# Patient Record
Sex: FEMALE | Race: ASIAN | NOT HISPANIC OR LATINO | Employment: UNEMPLOYED | URBAN - METROPOLITAN AREA
[De-identification: names, ages, dates, MRNs, and addresses within clinical notes are randomized per-mention and may not be internally consistent; named-entity substitution may affect disease eponyms.]

---

## 2017-01-25 ENCOUNTER — OFFICE VISIT - HEALTHEAST (OUTPATIENT)
Dept: FAMILY MEDICINE | Facility: CLINIC | Age: 22
End: 2017-01-25

## 2017-01-25 DIAGNOSIS — N61.0 MASTITIS: ICD-10-CM

## 2017-04-06 ENCOUNTER — AMBULATORY - HEALTHEAST (OUTPATIENT)
Dept: FAMILY MEDICINE | Facility: CLINIC | Age: 22
End: 2017-04-06

## 2017-04-06 DIAGNOSIS — Z11.1 TUBERCULOSIS SCREENING: ICD-10-CM

## 2017-04-10 ENCOUNTER — AMBULATORY - HEALTHEAST (OUTPATIENT)
Dept: LAB | Facility: CLINIC | Age: 22
End: 2017-04-10

## 2017-04-10 DIAGNOSIS — Z11.1 TUBERCULOSIS SCREENING: ICD-10-CM

## 2017-04-12 ENCOUNTER — COMMUNICATION - HEALTHEAST (OUTPATIENT)
Dept: FAMILY MEDICINE | Facility: CLINIC | Age: 22
End: 2017-04-12

## 2017-11-19 ENCOUNTER — HEALTH MAINTENANCE LETTER (OUTPATIENT)
Age: 22
End: 2017-11-19

## 2018-05-15 ENCOUNTER — OFFICE VISIT - HEALTHEAST (OUTPATIENT)
Dept: FAMILY MEDICINE | Facility: CLINIC | Age: 23
End: 2018-05-15

## 2018-05-15 DIAGNOSIS — R22.9 SKIN MASS: ICD-10-CM

## 2018-05-15 DIAGNOSIS — M54.50 LOW BACK PAIN: ICD-10-CM

## 2018-05-15 RX ORDER — ACETAMINOPHEN 325 MG/1
TABLET ORAL
Qty: 120 TABLET | Refills: 0 | Status: SHIPPED | OUTPATIENT
Start: 2018-05-15 | End: 2021-10-20

## 2020-10-22 ENCOUNTER — OFFICE VISIT - HEALTHEAST (OUTPATIENT)
Dept: FAMILY MEDICINE | Facility: CLINIC | Age: 25
End: 2020-10-22

## 2020-10-22 DIAGNOSIS — N89.8 VAGINAL DISCHARGE: ICD-10-CM

## 2020-10-22 DIAGNOSIS — Z31.69 INFERTILITY COUNSELING: ICD-10-CM

## 2020-10-22 DIAGNOSIS — Z00.00 ANNUAL PHYSICAL EXAM: ICD-10-CM

## 2020-10-22 LAB
CLUE CELLS: NORMAL
FSH SERPL-ACNC: <3 MIU/ML
PROLACTIN SERPL-MCNC: 13.2 NG/ML (ref 0–20)
TRICHOMONAS, WET PREP: NORMAL
TSH SERPL DL<=0.005 MIU/L-ACNC: 0.95 UIU/ML (ref 0.3–5)
YEAST, WET PREP: NORMAL

## 2020-10-22 ASSESSMENT — MIFFLIN-ST. JEOR: SCORE: 1232.67

## 2020-10-23 LAB
BKR LAB AP ABNORMAL BLEEDING: NO
BKR LAB AP BIRTH CONTROL/HORMONES: NORMAL
BKR LAB AP CERVICAL APPEARANCE: NORMAL
BKR LAB AP GYN ADEQUACY: NORMAL
BKR LAB AP GYN INTERPRETATION: NORMAL
BKR LAB AP GYN OTHER FINDINGS: NORMAL
BKR LAB AP HPV REFLEX: NORMAL
BKR LAB AP LMP: NORMAL
BKR LAB AP PATIENT STATUS: NORMAL
BKR LAB AP PREVIOUS ABNORMAL: NORMAL
BKR LAB AP PREVIOUS NORMAL: 2015
C TRACH DNA SPEC QL PROBE+SIG AMP: NEGATIVE
HIGH RISK?: NO
N GONORRHOEA DNA SPEC QL NAA+PROBE: NEGATIVE
PATH REPORT.COMMENTS IMP SPEC: NORMAL
RESULT FLAG (HE HISTORICAL CONVERSION): NORMAL

## 2020-10-26 ENCOUNTER — COMMUNICATION - HEALTHEAST (OUTPATIENT)
Dept: FAMILY MEDICINE | Facility: CLINIC | Age: 25
End: 2020-10-26

## 2020-10-26 DIAGNOSIS — N89.8 VAGINAL DISCHARGE: ICD-10-CM

## 2020-10-27 ENCOUNTER — COMMUNICATION - HEALTHEAST (OUTPATIENT)
Dept: FAMILY MEDICINE | Facility: CLINIC | Age: 25
End: 2020-10-27

## 2021-05-17 ENCOUNTER — COMMUNICATION - HEALTHEAST (OUTPATIENT)
Dept: FAMILY MEDICINE | Facility: CLINIC | Age: 26
End: 2021-05-17

## 2021-05-18 ENCOUNTER — OFFICE VISIT - HEALTHEAST (OUTPATIENT)
Dept: FAMILY MEDICINE | Facility: CLINIC | Age: 26
End: 2021-05-18

## 2021-05-18 ENCOUNTER — RECORDS - HEALTHEAST (OUTPATIENT)
Dept: ADMINISTRATIVE | Facility: OTHER | Age: 26
End: 2021-05-18

## 2021-05-18 DIAGNOSIS — O20.9 FIRST TRIMESTER BLEEDING: ICD-10-CM

## 2021-05-18 DIAGNOSIS — Z32.01 PREGNANCY TEST POSITIVE: ICD-10-CM

## 2021-05-18 DIAGNOSIS — O21.9 NAUSEA AND VOMITING IN PREGNANCY: ICD-10-CM

## 2021-05-18 DIAGNOSIS — L29.9 PRURITUS: ICD-10-CM

## 2021-05-18 LAB
CLUE CELLS: NORMAL
HCG SERPL-ACNC: ABNORMAL MLU/ML (ref 0–4)
TRICHOMONAS, WET PREP: NORMAL
YEAST, WET PREP: NORMAL

## 2021-05-18 RX ORDER — BENZOCAINE/MENTHOL 6 MG-10 MG
LOZENGE MUCOUS MEMBRANE
Qty: 30 G | Refills: 0 | Status: SHIPPED | OUTPATIENT
Start: 2021-05-18 | End: 2021-10-20

## 2021-05-18 RX ORDER — PRENATAL VIT/IRON FUM/FOLIC AC 27MG-0.8MG
1 TABLET ORAL DAILY
Qty: 30 TABLET | Refills: 11 | Status: SHIPPED | OUTPATIENT
Start: 2021-05-18 | End: 2022-02-11

## 2021-05-18 RX ORDER — PYRIDOXINE HCL (VITAMIN B6) 25 MG
25 TABLET ORAL EVERY 6 HOURS PRN
Qty: 60 TABLET | Refills: 1 | Status: SHIPPED | OUTPATIENT
Start: 2021-05-18 | End: 2021-10-20

## 2021-05-20 LAB
C TRACH DNA SPEC QL PROBE+SIG AMP: NEGATIVE
N GONORRHOEA DNA SPEC QL NAA+PROBE: NEGATIVE

## 2021-05-21 ENCOUNTER — OFFICE VISIT - HEALTHEAST (OUTPATIENT)
Dept: FAMILY MEDICINE | Facility: CLINIC | Age: 26
End: 2021-05-21

## 2021-05-21 ENCOUNTER — COMMUNICATION - HEALTHEAST (OUTPATIENT)
Dept: NURSING | Facility: CLINIC | Age: 26
End: 2021-05-21

## 2021-05-21 DIAGNOSIS — O20.9 FIRST TRIMESTER BLEEDING: ICD-10-CM

## 2021-05-21 DIAGNOSIS — Z59.71 INSURANCE COVERAGE PROBLEMS: ICD-10-CM

## 2021-05-21 LAB — HCG SERPL-ACNC: ABNORMAL MLU/ML (ref 0–4)

## 2021-05-25 ENCOUNTER — HOSPITAL ENCOUNTER (OUTPATIENT)
Dept: ULTRASOUND IMAGING | Facility: HOSPITAL | Age: 26
Discharge: HOME | End: 2021-05-25
Attending: FAMILY MEDICINE

## 2021-05-25 DIAGNOSIS — O20.9 FIRST TRIMESTER BLEEDING: ICD-10-CM

## 2021-05-26 ENCOUNTER — COMMUNICATION - HEALTHEAST (OUTPATIENT)
Dept: FAMILY MEDICINE | Facility: CLINIC | Age: 26
End: 2021-05-26

## 2021-05-27 VITALS
HEART RATE: 99 BPM | SYSTOLIC BLOOD PRESSURE: 120 MMHG | TEMPERATURE: 98.3 F | DIASTOLIC BLOOD PRESSURE: 80 MMHG | RESPIRATION RATE: 12 BRPM

## 2021-05-27 VITALS — WEIGHT: 111 LBS

## 2021-05-30 VITALS — WEIGHT: 111.5 LBS | BODY MASS INDEX: 19.14 KG/M2

## 2021-06-01 VITALS — BODY MASS INDEX: 19.91 KG/M2 | WEIGHT: 116 LBS

## 2021-06-08 NOTE — PROGRESS NOTES
Subjective:      Yesenia Pickett is a 22 y.o. female who presents for evaluation of fever and breast pain.  She says her breast pain started yesterday.  It is the upper outer quadrant of the left breast.  Pain is staying in that one spot.  She does feel like the skin might be read in that area.  Fever started late last night or early today.  She did take Tylenol this morning at about 8 AM.  No coughing, no sore throat, no vomiting or diarrhea.  She has had chills.  She does feel achy all over.  She is breast-feeding.  Her child is 15 months old.  Mom is primarily breast-feeding at night.  This is her first baby.  She has never had a problem with breast pain like this before.    Patient Active Problem List   Diagnosis     Abnormal Weight Loss     Constipation     Patient is a currently breast-feeding mother       Current Outpatient Prescriptions:      acetaminophen (TYLENOL) 325 MG tablet, Take 1-2 tablets every 6 hours, as needed for fever or pain., Disp: 120 tablet, Rfl: 0     dicloxacillin (DYNAPEN) 500 MG capsule, Take 1 capsule (500 mg total) by mouth 4 (four) times a day for 10 days., Disp: 40 capsule, Rfl: 0    Current Facility-Administered Medications:      medroxyPROGESTERone injection 150 mg (DEPO-PROVERA), 150 mg, Intramuscular, Q3 Months, Guera Pacheco PA-C     Objective:     Allergies:  Review of patient's allergies indicates no known allergies.    Vitals:  Vitals:    01/25/17 1504   BP: 118/74   Pulse: (!) 125   Resp: 12   Temp: (!) 102.7  F (39.3  C)   SpO2: 98%     Body mass index is 19.14 kg/(m^2).    Vital signs reviewed.  General: Patient is alert and oriented x 3, in no apparent distress  Ears: TMs nonerythematous with good light reflex bilaterally  Throat: No erythema or edema or exudate  Lymphatic: No anterior cervical lymph node enlargement  Cardiac: regular rate and rhythm, no murmurs  Pulmonary: lungs clear to auscultation bilaterally, no crackles, rales, rhonchi, or wheezing  noted  Breast exam: Right breast exam is completely healthy and normal.  Left breast has area of pain in the left upper outer quadrant, this area is also warm to the touch and skin is minimally erythematous, no masses palpable at all, no induration palpable, no nipple discharge from the left breast, remainder of left breast exam is completely normal     Assessment and Plan:   1.  Left breast pain with fever, probable mastitis.  I reviewed this diagnosis with patient.  Prescription sent for dicloxacillin 4 times daily for 10 days.  I reviewed she should continue to breast-feed if possible.  I also reviewed other symptomatic treatment for the breast pain.  We will call her on Friday to check in to make sure she is improving.  If anything is worsening before then she will contact the clinic.    This dictation uses voice recognition software, which may contain typographical errors.

## 2021-06-12 NOTE — TELEPHONE ENCOUNTER
----- Message from Mignon Jimenez RN sent at 10/26/2020  1:00 PM CDT -----  Hi Guera,    26 yo pt with NIL pap. There was an incidental finding on her pap smear consistent with Candida spp. Wet prep during visit was negative. If follow up from this incidental finding is needed, please forward to your nurse team as incidental findings are not handled by the pap team.    Will recommend repeat pap in 3 years. Thanks!    Mignon Jimenez RN BSN, Pap Tracking

## 2021-06-12 NOTE — TELEPHONE ENCOUNTER
Called and spoke with Yesenia Pickett , Message was given, Yesenia Pickett  understood, no further questions.  Use  line to contact :Jaime ID:[id number]

## 2021-06-12 NOTE — PROGRESS NOTES
Subjective:     Yesenia Pickett is a 25 y.o. female who presents for an annual exam.     Other concerns today:  1.  White vaginal discharge.    She says she has had vaginal discharge for years.  She feels like this past year it has been worse than normal.  No itching.  She is never really had medicine to treat this before.    2.  Infertility.  She has one child who is 5 years old.  She says she has not used any birth control since the child was born.  She would like to become pregnant.  She declines offer of birth control today.  She says she does get regular periods.  Current partner is same as for her child.  No past surgeries.  Her weight has been stable.    Immunization History   Administered Date(s) Administered     HPV Quadrivalent 2012, 06/15/2012, 2013     Hep A, Adult IM (19yr & older) 2013     Hep A, historic 2012     Hep B, historic 2011, 2012, 2012     INFLUENZA,SEASONAL QUAD, PF, =/> 6months 10/22/2020     Influenza, inj, historic,unspecified 2012     Influenza,seasonal quad, PF 2014     Influenza,seasonal,quad inj =/> 6months 2015, 09/15/2016     MMR 2011, 2012     Meningococcal MCV4P 2012     Td,adult,historic,unspecified 2012, 2013     Tdap 2011, 2015     Varicella 2012, 06/15/2012       Gynecologic History  Patient's last menstrual period was 10/02/2020 (within days).  Contraception: none  Last Pap:   Last mammogram: n/a    OB History    Para Term  AB Living   1 1 1     1   SAB TAB Ectopic Multiple Live Births           1      # Outcome Date GA Lbr Kavon/2nd Weight Sex Delivery Anes PTL Lv   1 Term 10/21/15 39w6d 03:43 / 01:22 7 lb 8 oz (3.402 kg) F Vag-Spont Local N DADA         Current Outpatient Medications:      acetaminophen (TYLENOL) 325 MG tablet, Take 1-2 tablets every 6 hours, as needed for fever or pain., Disp: 120 tablet, Rfl: 0  Past Medical History:   Diagnosis Date      Female infertility      No past surgical history on file.  Patient has no known allergies.  Family History   Problem Relation Age of Onset     Heart defect Sister      No Medical Problems Father      No Medical Problems Brother      No Medical Problems Brother      No Medical Problems Sister      No Medical Problems Sister      Social History     Socioeconomic History     Marital status: Single     Spouse name: Not on file     Number of children: Not on file     Years of education: Not on file     Highest education level: Not on file   Occupational History     Not on file   Social Needs     Financial resource strain: Not on file     Food insecurity     Worry: Not on file     Inability: Not on file     Transportation needs     Medical: Not on file     Non-medical: Not on file   Tobacco Use     Smoking status: Never Smoker     Smokeless tobacco: Never Used     Tobacco comment: no exposure   Substance and Sexual Activity     Alcohol use: No     Drug use: No     Sexual activity: Yes     Partners: Male     Birth control/protection: None   Lifestyle     Physical activity     Days per week: Not on file     Minutes per session: Not on file     Stress: Not on file   Relationships     Social connections     Talks on phone: Not on file     Gets together: Not on file     Attends Druze service: Not on file     Active member of club or organization: Not on file     Attends meetings of clubs or organizations: Not on file     Relationship status: Not on file     Intimate partner violence     Fear of current or ex partner: Not on file     Emotionally abused: Not on file     Physically abused: Not on file     Forced sexual activity: Not on file   Other Topics Concern     Not on file   Social History Narrative     Not on file       Review of Systems  Complete Review of Systems is discussed with patient and is negative except as noted in HPI.    Objective:     Vitals:    10/22/20 1549   BP: 114/62   Pulse: 80   Resp: 16      Body mass index is 19.29 kg/m .    Physical Exam:  General: Patient is alert and Oriented x 3, in no apparent distress.  HEENT, Thyroid, Lymphatic, Cardiac, Pulmonary, GI, Musculoskeletal, and Neuro exams were completed today and grossly normal.  Breast Exam: No lumps, skin changes, lymphadenopathy, or nipple discharge noted bilaterally.  Genitourinary Exam: External genitalia is normal in appearance, vaginal walls are healthy and without lesions, no significant discharge noted in the vaginal vault, cervix is well visualized and normal in appearance.  Pap was taken without difficulty.    Results for orders placed or performed in visit on 10/22/20   Wet Prep, Vaginal    Specimen: Genital   Result Value Ref Range    Yeast Result No yeast seen No yeast seen    Trichomonas No Trichomonas seen No Trichomonas seen    Clue Cells, Wet Prep No Clue cells seen No Clue cells seen   other labs pending.    Assessment and Plan:     1. Annual physical exam  Health Maintenance discussed with patient as appropriate for age and risk factors.  Pap done today.  She will be informed of results when available.  - Gynecologic Cytology (PAP Smear)    2. Infertility counseling  I will inform her of results.  She will consider if she would like to see specialist.  - Follicle Stimulating Hormone (FSH)  - Thyroid Cascade  - Prolactin    3. Vaginal discharge  Will treat for presumptive BV and monitor.  - Wet Prep, Vaginal  - Chlamydia trachomatis & Neisseria gonorrhoeae, Amplified Detection    This dictation uses voice recognition software, which may contain typographical errors.

## 2021-06-12 NOTE — TELEPHONE ENCOUNTER
Can you call pt and let her know she has two medicines at the pharmacy, which will hopefully treat her symptoms.

## 2021-06-12 NOTE — TELEPHONE ENCOUNTER
The message was relayed to the patient. Language line was used, 's ID # is Natalee, [id number]. No further questions.

## 2021-06-12 NOTE — TELEPHONE ENCOUNTER
----- Message from Guera Pacheco PA-C sent at 10/25/2020  7:49 PM CDT -----  Her labs are all normal.  -I am going to send her a medicine that will hopefully treat her vaginal discharge.  -does she want to see a specialist about her infertility?

## 2021-06-16 PROBLEM — Z31.69 INFERTILITY COUNSELING: Status: ACTIVE | Noted: 2020-10-22

## 2021-06-17 NOTE — PROGRESS NOTES
"ASSESSMENT/PLAN:  1. First trimester bleeding  Beta-hCG, Quantitative   2. Insurance coverage problems  Ambulatory referral to Care Management (Primary Care)       This is a 25 yo female seen earlier in week with positive pregnancy test, and reported first trimester bleeding.  She is here for follow up.  Her beta HCG was reasonable earlier in week - consistent with dates.  No further bleeding - perhaps some brown discharge.  Will repeat quant betaHCG today; encourage follow through with ultrasound .  Patient notes today that she is struggling with her medical insurance - believes her insurance should be \"free\" since she is pregnant.  Has had 2 visits to our clinic this week and had to pay a $25 copay each time.  She wants to find the free insurance for pregnant women.  Will refer to our Care Management team to help explore this with her.  Explained to patient that given her pregnancy, she does need to be seen, especially with potential complications at this time (bleeding could represent possible miscarriage).     Return in about 2 weeks (around 6/4/2021) for for 1st OB visit.      There are no discontinued medications.  There are no Patient Instructions on file for this visit.    Chief Complaint:  Chief Complaint   Patient presents with     Follow-up       HPI:   Yesenia Pickett is a 26 y.o. female c/o  Previous first trimester bleeding -   None since earlier this week  Had bHCG of 44,079 earlier this week -   Just since yesterday - brown discharge -       Concerned about paying for her health insurance -   Can get \"free pregnancy insurance\"  Worried about costs - has had 2 visits this week and had to pay $25 each time - this is a hardship for patient.     Just this week - after eating, feels really full -   Can just eat 4-5 spoonfuls of rice - feels full the rest of day         PMH:   Patient Active Problem List    Diagnosis Date Noted     Infertility counseling 10/22/2020     Constipation 06/18/2015     Abnormal " Weight Loss      Past Medical History:   Diagnosis Date     Female infertility      No past surgical history on file.  Social History     Socioeconomic History     Marital status: Single     Spouse name: Not on file     Number of children: Not on file     Years of education: Not on file     Highest education level: Not on file   Occupational History     Not on file   Social Needs     Financial resource strain: Not on file     Food insecurity     Worry: Not on file     Inability: Not on file     Transportation needs     Medical: Not on file     Non-medical: Not on file   Tobacco Use     Smoking status: Never Smoker     Smokeless tobacco: Never Used     Tobacco comment: no exposure   Substance and Sexual Activity     Alcohol use: No     Drug use: No     Sexual activity: Yes     Partners: Male     Birth control/protection: None   Lifestyle     Physical activity     Days per week: Not on file     Minutes per session: Not on file     Stress: Not on file   Relationships     Social connections     Talks on phone: Not on file     Gets together: Not on file     Attends Anabaptism service: Not on file     Active member of club or organization: Not on file     Attends meetings of clubs or organizations: Not on file     Relationship status: Not on file     Intimate partner violence     Fear of current or ex partner: Not on file     Emotionally abused: Not on file     Physically abused: Not on file     Forced sexual activity: Not on file   Other Topics Concern     Not on file   Social History Narrative     Not on file     Family History   Problem Relation Age of Onset     Heart defect Sister      No Medical Problems Father      No Medical Problems Brother      No Medical Problems Brother      No Medical Problems Sister      No Medical Problems Sister        Meds:    Current Outpatient Medications:      hydrocortisone 1 % cream, Apply topically two times a day to affected area (left arm) no more than 14 days, Disp: 30 g, Rfl: 0      prenatal vit no.130-iron-folic (PRENATAL VITAMIN) 27 mg iron- 800 mcg Tab tablet, Take 1 tablet by mouth once daily., Disp: 30 tablet, Rfl: 11     pyridoxine, vitamin B6, (B-6) 25 MG tablet, Take 1 tablet (25 mg total) by mouth every 6 (six) hours as needed (nausea)., Disp: 60 tablet, Rfl: 1     acetaminophen (TYLENOL) 325 MG tablet, Take 1-2 tablets every 6 hours, as needed for fever or pain., Disp: 120 tablet, Rfl: 0    Allergies:  No Known Allergies    ROS:  Pertinent positives as noted in HPI; otherwise 12 point ROS negative.      Physical Exam:  EXAM:  /73 (Patient Site: Right Arm, Patient Position: Sitting, Cuff Size: Adult Regular)   Pulse (!) 106   Temp 98.6  F (37  C) (Temporal)   Resp 14   Wt 109 lb (49.4 kg)   LMP  (LMP Unknown)   BMI 18.56 kg/m     Gen:  NAD, appears well, well-hydrated  HEENT:  TMs nl, oropharynx benign, nasal mucosa nl, conjunctiva clear  Neck:  Supple, no adenopathy, no thyromegaly, no carotid bruits, no JVD  Lungs:  Clear to auscultation bilaterally  Cor:  RRR no murmur  Abd:  Soft, nontender, BS+, no masses, no guarding or rebound, no HSM  Extr:  Neg.  Neuro:  No asymmetry  Skin:  Warm/dry        Results:  Results for orders placed or performed in visit on 05/21/21   Beta-hCG, Quantitative   Result Value Ref Range    Beta-hCG, Quantitative 71,360 (H) 0 - 4 mlU/mL

## 2021-06-17 NOTE — TELEPHONE ENCOUNTER
New Appointment Needed  What is the reason for the visit:    Pregnancy Confirmation Appt Needed  When was the first day of your last menstrual cycle?: 4/1/21  Have you done a home pregnancy test?: Yes: 5/12/21.    Provider Preference: Any available  How soon do you need to be seen?: as soon as available.  Waitlist offered?: No  Okay to leave a detailed message:  Yes

## 2021-06-17 NOTE — PROGRESS NOTES
ASSESSMENT/PLAN:  1. First trimester bleeding  US OB < 14 Weeks    Beta-hCG, Quantitative   2. Nausea and vomiting in pregnancy  pyridoxine, vitamin B6, (B-6) 25 MG tablet    DISCONTINUED: pyridoxine, vitamin B6, (VITAMIN B-6) 50 MG tablet   3. Pregnancy test positive  prenatal vit no.130-iron-folic (PRENATAL VITAMIN) 27 mg iron- 800 mcg Tab tablet    Wet Prep, Vaginal    Chlamydia trachomatis & Neisseria gonorrhoeae, Amplified Detection   4. Pruritus  hydrocortisone 1 % cream       This is a 25 yo female with nausea and vomiting - and has positive pregnancy test today .  Discussed gestational age.  Patient reports recent bleeding - although this sounds as if it may be fairly minimal.  We will start prenatal vitamins, pyridoxine (for nausea).  Will check quant beta Hcg to have a baseline.  Patient is B positive blood type, so doesn't need Rhogam due to bleeding.  Vaginal exam done and GC/CHlamydia  And wet prep were sent.  Would treat only if positive results.  Have also requested an OB ultrasound for dating purposes - and check fetal viability.  Patient is agreeable.  Will ask for short term followup later this week for recheck on bleeding and quant HCG.     She also complains of pruritus - will start HC cream only sparingly.   Return in about 3 days (around 5/21/2021) for Recheck.      Medications Discontinued During This Encounter   Medication Reason     pyridoxine, vitamin B6, (VITAMIN B-6) 50 MG tablet Dose adjustment     There are no Patient Instructions on file for this visit.    Chief Complaint:  Chief Complaint   Patient presents with     Nausea     2 weeks        HPI:   Yesenia Pickett is a 26 y.o. female c/o  Feels nauseated -   Pregnant -  Hasn't been seen for pregnancy yet -   April 5-10 - LMP -   Home pregnancy test positive  Has 1 child - had nausea/vomiting in that pregnancy  EDC 1/10/2022, currently EGA 6w1d    Would like to be checked out - has been having blood in urine Friday - Monday    No cramping -    Blood on underwear - none yesterday or today             PMH:   Patient Active Problem List    Diagnosis Date Noted     Infertility counseling 10/22/2020     Constipation 06/18/2015     Abnormal Weight Loss      Past Medical History:   Diagnosis Date     Female infertility      History reviewed. No pertinent surgical history.  Social History     Socioeconomic History     Marital status: Single     Spouse name: Not on file     Number of children: Not on file     Years of education: Not on file     Highest education level: Not on file   Occupational History     Not on file   Social Needs     Financial resource strain: Not on file     Food insecurity     Worry: Not on file     Inability: Not on file     Transportation needs     Medical: Not on file     Non-medical: Not on file   Tobacco Use     Smoking status: Never Smoker     Smokeless tobacco: Never Used     Tobacco comment: no exposure   Substance and Sexual Activity     Alcohol use: No     Drug use: No     Sexual activity: Yes     Partners: Male     Birth control/protection: None   Lifestyle     Physical activity     Days per week: Not on file     Minutes per session: Not on file     Stress: Not on file   Relationships     Social connections     Talks on phone: Not on file     Gets together: Not on file     Attends [employer] service: Not on file     Active member of club or organization: Not on file     Attends meetings of clubs or organizations: Not on file     Relationship status: Not on file     Intimate partner violence     Fear of current or ex partner: Not on file     Emotionally abused: Not on file     Physically abused: Not on file     Forced sexual activity: Not on file   Other Topics Concern     Not on file   Social History Narrative     Not on file     Family History   Problem Relation Age of Onset     Heart defect Sister      No Medical Problems Father      No Medical Problems Brother      No Medical Problems Brother      No Medical Problems Sister       No Medical Problems Sister        Meds:    Current Outpatient Medications:      acetaminophen (TYLENOL) 325 MG tablet, Take 1-2 tablets every 6 hours, as needed for fever or pain., Disp: 120 tablet, Rfl: 0     hydrocortisone 1 % cream, Apply topically two times a day to affected area (left arm) no more than 14 days, Disp: 30 g, Rfl: 0     prenatal vit no.130-iron-folic (PRENATAL VITAMIN) 27 mg iron- 800 mcg Tab tablet, Take 1 tablet by mouth once daily., Disp: 30 tablet, Rfl: 11     pyridoxine, vitamin B6, (B-6) 25 MG tablet, Take 1 tablet (25 mg total) by mouth every 6 (six) hours as needed (nausea)., Disp: 60 tablet, Rfl: 1    Allergies:  No Known Allergies    ROS:  Pertinent positives as noted in HPI; otherwise 12 point ROS negative.      Physical Exam:  EXAM:  /80 (Patient Site: Left Arm, Patient Position: Sitting, Cuff Size: Adult Small)   Pulse 99   Temp 98.3  F (36.8  C) (Temporal)   Resp 12   Wt 111 lb (50.3 kg)   LMP 04/05/2021   BMI 18.91 kg/m     Gen:  NAD, appears well, well-hydrated  HEENT:  TMs nl, oropharynx benign, nasal mucosa nl, conjunctiva clear  Neck:  Supple, no adenopathy, no thyromegaly, no carotid bruits, no JVD  Lungs:  Clear to auscultation bilaterally  Cor:  RRR no murmur  Abd:  Soft, nontender, BS+, no masses, no guarding or rebound, no HSM  PELVIC EXAM:External genitalia: normal  Vaginal mucosa normal  Vaginal discharge: sl brown tinged  Speculum exam shows a normal appearing cervix .   Bimanual exam: Cervix closed, firm, non tender  to motion.  Uterus  firm, regular, mobile, non tender to palpation. No adnexal masses or tenderness.   Extr:  Neg.  Neuro:  No asymmetry  Skin:  Warm/dry        Results:  Results for orders placed or performed in visit on 05/18/21   Wet Prep, Vaginal    Specimen: Vaginal; Genital   Result Value Ref Range    Yeast Result No yeast seen No yeast seen    Trichomonas No Trichomonas seen No Trichomonas seen    Clue Cells, Wet Prep No Clue cells  seen No Clue cells seen   Chlamydia trachomatis & Neisseria gonorrhoeae, Amplified Detection    Specimen: Cervix, Endocervical; Body Fluid   Result Value Ref Range    Chlamydia trachomatis, Amplified Detection Negative Negative    Neisseria gonorrhoeae, Amplified Detection Negative Negative   Beta-hCG, Quantitative   Result Value Ref Range    Beta-hCG, Quantitative 44,079 (H) 0 - 4 mlU/mL

## 2021-06-17 NOTE — TELEPHONE ENCOUNTER
Patient has a future F2F appointment on 5/18/21 with Dr. Mercado. Pt is aware that pt is pregnant but, is spotting and pt would like to see a provider to make sure that pt is ok. Completing task.

## 2021-06-18 NOTE — PROGRESS NOTES
"  Subjective:      Yesenia Pickett is a 23 y.o. female who presents for evaluation of 2 concerns:    1.  Lump on buttocks.  She has a lump on her left buttock.  It has been there for about a month.  She has never had a problem like this before.  She feels like it is getting bigger.  With direct pressure it is somewhat painful, otherwise not painful doing normal activities.  She has not noticed any drainage.    2.  Low back pain.  She has had pain in her low back for about 2 weeks.  She thinks it started when she was sitting down with family.  She turned and twisted to the right because there is a child who is falling next her and she was trying to catch them.  Since then she has had pain.  It is in the bilateral low back.  No radiation to the legs.  No loss of control of bladder or bowel function.  She denies any other falls or heavy lifting.  Pain can come and go.  Sometimes pain is pretty minimal, other times she says that it is bad enough that she \"cannot move.\"  She has not really done anything to treat this yet.  She notes that bending over or lifting something heavy makes the pain worse.  She cannot think of anything that makes it better.  I asked her to describe the pain and she is unable to do that.  She is able to continue working and doing her normal daily activities.    Review of systems: All others negative.    Patient Active Problem List   Diagnosis     Abnormal Weight Loss     Constipation       Current Outpatient Prescriptions:      acetaminophen (TYLENOL) 325 MG tablet, Take 1-2 tablets every 6 hours, as needed for fever or pain., Disp: 120 tablet, Rfl: 0     cephalexin (KEFLEX) 500 MG capsule, Take 1 capsule (500 mg total) by mouth 3 (three) times a day for 10 days., Disp: 30 capsule, Rfl: 0     Objective:     No Known Allergies  Vitals:    05/15/18 0958   BP: 128/80   Pulse: 89   SpO2: 98%     Body mass index is 19.91 kg/(m^2).    Vitals reviewed as above.  General: Patient is alert and oriented x 3, in " no apparent distress  Cardiac: Regular rate and rhythm, no murmurs  Pulmonary: lungs clear to ausculation, no crackles, rales, rhonchi, or wheezing  Musculoskeletal: normal 4/5 strength in major muscle groups in lower extremities bilaterally, normal foot strength, negative straight leg raise test bilaterally, slightly decreased but symmetric patellar reflexes bilaterally, patient walks a normal tandem gait, she can flex to 50  at the waist without pain, main area of pain is the paraspinous muscles in the lumbar sacral area, no pain with direct palpation on the spots, mild pain triggered with palpation of the SI joints bilaterally, no pain with palpation of bilateral hip joints, no pain with palpation of lumbar sacral spinous processes  Skin: Skin in the affected area is normal, no rashes.  Small mildly indurated area present on the left buttock, there is a small central lesion appears to be a scratch or bite, surrounded by a large area of indurated tissue, 4 cm in length and about 3 cm in width, no pain with palpation at the spot, no redness to the skin, no fluid expressed with pressure    Assessment and Plan:     1.  Buttock mass.  Most likely subacute infection.  Prescription sent for Keflex today.  If this does not resolve the mass, would refer her to dermatology for follow-up.  Patient asked if I could drain the lesion today, but there was no fluctuance and I did not feel I would be able to drain any fluid from it.    2.  Low back pain, likely musculoskeletal in origin.  I reviewed symptomatic treatment with patient.  Prescription sent for Tylenol.  She will continue to monitor this for 1-2 more weeks.  If pain does not continue to improve, she will contact the clinic.  Also contact us if anything is worsening.  Could consider physical therapy in the future if needed.  No red flags on exam or history today.    This dictation uses voice recognition software, which may contain typographical errors.

## 2021-06-21 NOTE — LETTER
Letter by Guera Pacheco PA-C at      Author: Guera Pacheco PA-C Service: -- Author Type: --    Filed:  Encounter Date: 10/27/2020 Status: (Other)         Yesenia Pickett  [address] [place] [address]             October 27, 2020         Dear Ms. Pickett,    We are happy to inform you that your recent Pap smear is normal.    It is recommended that you have your next Pap smear in 3 years. You will also need to return to the clinic every year for an annual wellness visit.    If you have additional questions regarding this result, please contact our office and we will be happy to assist you.      Sincerely,    Your [facility] Team

## 2021-06-25 NOTE — PROGRESS NOTES
5/27/2021  CCC referral from PCP  Insurance    Clinic Care Coordination Contact  Community Health Worker Initial Outreach    CHW Initial Information Gathering:  Referral Source: PCP  Preferred Hospital: [facility]  [phone]  Preferred Urgent Care: [facility], [phone]  Current living arrangement:: I live in a private home with spouse       Patient accepts CC: No, already have someone to help apply for insurance. would information for CCC.. Patient will be sent Care Coordination introduction letter for future reference.     Brigitte : Peyton ID# [id number]     The clinic Community Health Worker talked with the patient today at the request of the PCP to discuss possible Clinic Care Coordination enrollment.  The service was described to the patient and immediate needs were discussed.  The patient declined enrollement at this time.  The PCP is encouraged to refer in the future if the patient's needs change.    Patient stated she had someone fill out the application and sent it to the [facility].  No other needs at this time.  Would like CCC information mail to her.

## 2021-06-25 NOTE — PROGRESS NOTES
"5/26/2021  CCC referral from PCP  Insurance coverage  PCP encounter note. \"she is struggling with her medical insurance - believes her insurance should be \"free\" since she is pregnant.  Has had 2 visits to our clinic this week and had to pay a $25 copay each time.  She wants to find the free insurance for pregnant women.  Will refer to our Care Management team to help explore this with her.\"     Clinic Care Coordination Contact  [facility]     Clinical Data: Care Coordinator Outreach  Outreach attempted x 2.  No answer. NO VM. Plan: Care Coordinator will try to reach patient again in 3-5 business days.    CHW outreach:5-28-21    "

## 2021-06-25 NOTE — TELEPHONE ENCOUNTER
Provider response below relayed to patient. Message understood.  ----- Message from Ani Prince MD sent at 5/26/2021 10:09 AM CDT -----  Please let patient know that her ultrasound looks good - there is a live fetus present - at about the same gestation as would be predicted by her dates.  She has a due date of 1/8/2022.  She should make a first OB visit with Guera.  (maybe we can help her make that if she hasn't already done this).  Also - her other labs look good as well.

## 2021-07-02 ENCOUNTER — DOCUMENTATION ONLY (OUTPATIENT)
Dept: ADMINISTRATIVE | Facility: OTHER | Age: 26
End: 2021-07-02

## 2021-07-02 NOTE — PROGRESS NOTES
This encounter was created as part of manual pregnancy episode data conversion for the single EHR project. The following information (where applicable) was manually abstracted from the [employer] Epic instance on July 2, 2021: pregnancy episode name/date, dating, OB care provider, episode encounter linking, pregravid weight, number of fetuses, positive genetic screening responses, and pregnancy overview and plan.     Radha Corey RN   Clinical Informatics

## 2021-07-04 NOTE — LETTER
Letter by Adonay Mott CHW at      Author: Adonay Mott CHW Service: -- Author Type: --    Filed:  Encounter Date: 5/21/2021 Status: (Other)       CARE COORDINATION  [facility] [address] [place] [address]    May 27, 2021    Yesenia Piyush  [address]  [place] [address]      Dear Yesenia,    I am a clinic community health worker who works with Lucien Sosa MD at [facility].  I wanted to thank you for spending the time to talk with me on 5-27-21.  Below is a description of clinic care coordination and how I can further assist you.      The clinic care coordination team is made up of a registered nurse,  and community health worker who understand the health care system. The goal of clinic care coordination is to help you manage your health and improve access to the health care system in the most efficient manner. The team can assist you in meeting your health care goals by providing education, coordinating services, strengthening the communication among your providers and supporting you with any resource needs.    Please feel free to contact me at [phone] with any questions or concerns. We are focused on providing you with the highest-quality healthcare experience possible and that all starts with you.         Sincerely,     CARLOS Robles  Clinic Care Coordination  [facility].

## 2021-07-05 PROBLEM — Z3A.11 11 WEEKS GESTATION OF PREGNANCY: Status: ACTIVE | Noted: 2021-06-24

## 2021-07-05 PROBLEM — Z31.69 INFERTILITY COUNSELING: Status: RESOLVED | Noted: 2020-10-22 | Resolved: 2021-06-24

## 2021-07-14 PROBLEM — Z31.69 INFERTILITY COUNSELING: Status: RESOLVED | Noted: 2020-10-22 | Resolved: 2021-06-24

## 2021-07-22 NOTE — PROGRESS NOTES
5/21/2021  CCC referral from PCP  Insurance coverage    Clinic Care Coordination [facility]       Clinical Data: Care Coordinator Outreach  Outreach attempted x 1.  No answer. NO VM. Plan: Care Coordinator will try to reach patient again in 3-5 business days.      CHW outreach 5-24-21

## 2021-07-27 ENCOUNTER — TRANSCRIBE ORDERS (OUTPATIENT)
Dept: MATERNAL FETAL MEDICINE | Facility: HOSPITAL | Age: 26
End: 2021-07-27

## 2021-07-27 ENCOUNTER — PRENATAL OFFICE VISIT (OUTPATIENT)
Dept: FAMILY MEDICINE | Facility: CLINIC | Age: 26
End: 2021-07-27
Payer: COMMERCIAL

## 2021-07-27 VITALS
RESPIRATION RATE: 16 BRPM | BODY MASS INDEX: 19.57 KG/M2 | TEMPERATURE: 98.3 F | WEIGHT: 114 LBS | HEART RATE: 97 BPM | DIASTOLIC BLOOD PRESSURE: 76 MMHG | SYSTOLIC BLOOD PRESSURE: 113 MMHG

## 2021-07-27 DIAGNOSIS — Z82.79 FAMILY HISTORY OF CONGENITAL HEART DISEASE IN SISTER: ICD-10-CM

## 2021-07-27 DIAGNOSIS — O26.90 PREGNANCY RELATED CONDITION, ANTEPARTUM: Primary | ICD-10-CM

## 2021-07-27 DIAGNOSIS — Z34.82 ENCOUNTER FOR SUPERVISION OF OTHER NORMAL PREGNANCY IN SECOND TRIMESTER: Primary | ICD-10-CM

## 2021-07-27 DIAGNOSIS — D69.6 THROMBOCYTOPENIA (H): ICD-10-CM

## 2021-07-27 DIAGNOSIS — J30.2 SEASONAL ALLERGIES: ICD-10-CM

## 2021-07-27 DIAGNOSIS — Z11.3 VENEREAL DISEASE SCREENING: ICD-10-CM

## 2021-07-27 LAB
ALBUMIN UR-MCNC: NEGATIVE MG/DL
GLUCOSE UR STRIP-MCNC: NEGATIVE MG/DL
KETONES UR STRIP-MCNC: NEGATIVE MG/DL

## 2021-07-27 PROCEDURE — 81003 URINALYSIS AUTO W/O SCOPE: CPT | Mod: 59 | Performed by: FAMILY MEDICINE

## 2021-07-27 PROCEDURE — 99207 PR PRENATAL VISIT: CPT | Performed by: FAMILY MEDICINE

## 2021-07-27 PROCEDURE — 99212 OFFICE O/P EST SF 10 MIN: CPT | Performed by: FAMILY MEDICINE

## 2021-07-27 RX ORDER — CETIRIZINE HYDROCHLORIDE 10 MG/1
10 TABLET ORAL DAILY
Qty: 30 TABLET | Refills: 1 | Status: SHIPPED | OUTPATIENT
Start: 2021-07-27 | End: 2021-10-20

## 2021-07-27 NOTE — PROGRESS NOTES
"First OB   Feeling well.   No Nausea  Good Appetite  No pelvic pain, vaginal bleeding, discharge  No other concerns    Itchy eyes. Was seeing eye doctor over the last couple months  Eye lids. Eye lashes?  Given some eye drops. White bottle   Eye lids are itchy.     Taking PNV: yes    Labs/imaging reviewed  Discussed screening for aneuploidy and neural tube defects, SMA and CF.  declines     Preformed physical exam : see flow sheet  Reviewed BRETT, past medical history, past surgical history, family history, genetic history, and previous obstetrical history.   Encouraged good nutrition, well balanced diet, regular activity.  Discussed foods to avoid.  And other applicable safety/health risks in pregnancy.    Yesenia was seen today for prenatal care.    Diagnoses and all orders for this visit:    Encounter for supervision of normal pregnancy in second trimester  -     Urinalysis, OB Screen; Future  -     Urinalysis, OB Screen    Seasonal allergies: Trial of Zyrtec.  Suspect allergies which are really bad right now this summer to the dry heat.  -     cetirizine (ZYRTEC) 10 MG tablet; Take 1 tablet (10 mg) by mouth daily    Venereal disease screening  -     Chlamydia trachomatis PCR; Future  -     Neisseria gonorrhoeae PCR; Future    Family history of congenital heart disease in sister: Reviewed this history of sister with \"hole in her heart\".  Sounds like it did require surgical repair.  Recommend level 2 ultrasound with possible echo.  She was agreeable.  -     [facility] Referral - Pregnancy; Future    Thrombocytopenia (H): On chart review we have a labs dating back to 2018 when she went to the emergency room her platelets were also in the 120s.  Her last pregnancy in 2015 I do not have lab work but I reviewed the prenatal notes that I could see and I did not see any mention of thrombocytopenia.  Therefore it does not seem to be gestational induced.  Also the fact that it is presenting at the first prenatal visit " would also suggest that.  Likely benign has had had no issues with this and has not worsened in any way in the last few years.  However would like this to be worked up by specialist.  She was agreeable.  -     Oncology/Hematology Adult Referral; Future      Preeclampsia risk factors:    High risk factors (1+): none    Moderate risk factors (2+): none         Kyleigh Lazaro, DO

## 2021-08-12 ENCOUNTER — PRE VISIT (OUTPATIENT)
Dept: MATERNAL FETAL MEDICINE | Facility: HOSPITAL | Age: 26
End: 2021-08-12

## 2021-08-16 ENCOUNTER — OFFICE VISIT (OUTPATIENT)
Dept: MATERNAL FETAL MEDICINE | Facility: HOSPITAL | Age: 26
End: 2021-08-16
Attending: FAMILY MEDICINE
Payer: COMMERCIAL

## 2021-08-16 ENCOUNTER — ANCILLARY PROCEDURE (OUTPATIENT)
Dept: ULTRASOUND IMAGING | Facility: HOSPITAL | Age: 26
End: 2021-08-16
Attending: FAMILY MEDICINE
Payer: COMMERCIAL

## 2021-08-16 DIAGNOSIS — O26.90 PREGNANCY RELATED CONDITION, ANTEPARTUM: ICD-10-CM

## 2021-08-16 DIAGNOSIS — O35.9XX0 SUSPECTED FETAL ANOMALY, ANTEPARTUM, SINGLE OR UNSPECIFIED FETUS: ICD-10-CM

## 2021-08-16 DIAGNOSIS — Z82.79 FAMILY HISTORY OF CONGENITAL HEART DISEASE IN SISTER: Primary | ICD-10-CM

## 2021-08-16 PROCEDURE — 99207 PR NO CHARGE LOS: CPT | Performed by: OBSTETRICS & GYNECOLOGY

## 2021-08-16 PROCEDURE — 76811 OB US DETAILED SNGL FETUS: CPT

## 2021-08-16 PROCEDURE — 76811 OB US DETAILED SNGL FETUS: CPT | Mod: 26 | Performed by: OBSTETRICS & GYNECOLOGY

## 2021-08-16 NOTE — PROGRESS NOTES
"Please see \"Imaging\" tab under Chart Review for full details.    Debby Randolph MD  Maternal Fetal Medicine    "

## 2021-08-30 ENCOUNTER — PRENATAL OFFICE VISIT (OUTPATIENT)
Dept: FAMILY MEDICINE | Facility: CLINIC | Age: 26
End: 2021-08-30
Payer: COMMERCIAL

## 2021-08-30 VITALS
HEART RATE: 94 BPM | BODY MASS INDEX: 21.03 KG/M2 | RESPIRATION RATE: 16 BRPM | DIASTOLIC BLOOD PRESSURE: 56 MMHG | TEMPERATURE: 97.8 F | SYSTOLIC BLOOD PRESSURE: 98 MMHG | WEIGHT: 122.5 LBS

## 2021-08-30 DIAGNOSIS — N89.8 WHITE VAGINAL DISCHARGE: ICD-10-CM

## 2021-08-30 DIAGNOSIS — Z34.82 ENCOUNTER FOR SUPERVISION OF OTHER NORMAL PREGNANCY IN SECOND TRIMESTER: Primary | ICD-10-CM

## 2021-08-30 DIAGNOSIS — D64.9 ANEMIA, UNSPECIFIED TYPE: ICD-10-CM

## 2021-08-30 DIAGNOSIS — D69.6 THROMBOCYTOPENIA (H): ICD-10-CM

## 2021-08-30 LAB
ALBUMIN UR-MCNC: NEGATIVE MG/DL
CLUE CELLS: ABNORMAL
ERYTHROCYTE [DISTWIDTH] IN BLOOD BY AUTOMATED COUNT: 14.4 % (ref 10–15)
FERRITIN SERPL-MCNC: 7 NG/ML (ref 10–130)
GLUCOSE UR STRIP-MCNC: NEGATIVE MG/DL
HCT VFR BLD AUTO: 32.8 % (ref 35–47)
HGB BLD-MCNC: 10.7 G/DL (ref 11.7–15.7)
IRON SATN MFR SERPL: 23 % (ref 20–50)
IRON SERPL-MCNC: 98 UG/DL (ref 42–175)
KETONES UR STRIP-MCNC: NEGATIVE MG/DL
MCH RBC QN AUTO: 29.6 PG (ref 26.5–33)
MCHC RBC AUTO-ENTMCNC: 32.6 G/DL (ref 31.5–36.5)
MCV RBC AUTO: 91 FL (ref 78–100)
PLATELET # BLD AUTO: 142 10E3/UL (ref 150–450)
RBC # BLD AUTO: 3.61 10E6/UL (ref 3.8–5.2)
TIBC SERPL-MCNC: 429 UG/DL (ref 313–563)
TRANSFERRIN SERPL-MCNC: 343 MG/DL (ref 212–360)
TRICHOMONAS, WET PREP: ABNORMAL
WBC # BLD AUTO: 7.8 10E3/UL (ref 4–11)
WBC'S/HIGH POWER FIELD, WET PREP: ABNORMAL
YEAST, WET PREP: ABNORMAL

## 2021-08-30 PROCEDURE — 84466 ASSAY OF TRANSFERRIN: CPT | Performed by: FAMILY MEDICINE

## 2021-08-30 PROCEDURE — 83021 HEMOGLOBIN CHROMOTOGRAPHY: CPT | Performed by: FAMILY MEDICINE

## 2021-08-30 PROCEDURE — 87210 SMEAR WET MOUNT SALINE/INK: CPT | Performed by: FAMILY MEDICINE

## 2021-08-30 PROCEDURE — 99207 PR PRENATAL VISIT: CPT | Performed by: FAMILY MEDICINE

## 2021-08-30 PROCEDURE — 36415 COLL VENOUS BLD VENIPUNCTURE: CPT | Performed by: FAMILY MEDICINE

## 2021-08-30 PROCEDURE — 85027 COMPLETE CBC AUTOMATED: CPT | Performed by: FAMILY MEDICINE

## 2021-08-30 PROCEDURE — 83540 ASSAY OF IRON: CPT | Performed by: FAMILY MEDICINE

## 2021-08-30 PROCEDURE — 82728 ASSAY OF FERRITIN: CPT | Performed by: FAMILY MEDICINE

## 2021-08-30 PROCEDURE — 83020 HEMOGLOBIN ELECTROPHORESIS: CPT | Performed by: FAMILY MEDICINE

## 2021-08-30 PROCEDURE — 81003 URINALYSIS AUTO W/O SCOPE: CPT | Performed by: FAMILY MEDICINE

## 2021-08-30 NOTE — PROGRESS NOTES
Due to language barrier, an  was present during the history-taking and subsequent discussion (and the physical exam) with this patient.     No ctx, lof, bleeding.  No HA or vision changes.  Good FM : yes  liquidy thick white discharge- is it normal.  Not itchy or irritating.  No bad smell.    Labs/imaging reviewed: ~level II Ultrasound reassuring -needs follow-up views face and nose.  Scheduled for echo.    Exam:   See flowsheet  GEN:  26 year old female sitting comfortably in no apparent distress.   HEENT:  no scleral icterus, buccal mucosa moist  CHEST/LUNG: No respiratory distress, unlabored breathing  ABD:  Soft, non-tender, Gravid uterus  PSYCH:  Mood and affect appropriate    Yesenia was seen today for prenatal care.    Diagnoses and all orders for this visit:    Encounter for supervision of other normal pregnancy in second trimester  -     Urinalysis, OB Screen; Future  -     Urinalysis, OB Screen    Thrombocytopenia (H): Does not appear to be gestational. Has appt scheduled with heme/onc 9/8.   -     CBC with platelets; Future  -     Iron and iron binding capacity; Future  -     Ferritin; Future    Anemia, unspecified type:  Further assess with labs below-   -     CBC with platelets; Future  -     Iron and iron binding capacity; Future  -     Ferritin; Future  -     Hemoglobinopathy/Thalassemia Cascade; Future    White vaginal discharge  -     Wet prep - lab collect; Future          Kyleigh Lazaro DO

## 2021-08-31 PROBLEM — Z3A.11 11 WEEKS GESTATION OF PREGNANCY: Status: RESOLVED | Noted: 2021-06-24 | Resolved: 2021-08-31

## 2021-08-31 PROBLEM — D64.9 ANEMIA, UNSPECIFIED TYPE: Status: ACTIVE | Noted: 2021-08-31

## 2021-08-31 PROBLEM — D69.6 THROMBOCYTOPENIA (H): Status: ACTIVE | Noted: 2021-08-31

## 2021-09-02 LAB
HEMOGLOBIN A2 QUANTITATION: 2.8 % (ref 2.2–3.5)
HEMOGLOBIN ELECTROPHRESIS: NORMAL
HEMOGLOBIN F QUANTITATION: 1.1 % (ref 0–2)
PATH ICD:: NORMAL
REVIEWING PATHOLOGIST: NORMAL

## 2021-09-08 ENCOUNTER — ONCOLOGY VISIT (OUTPATIENT)
Dept: ONCOLOGY | Facility: HOSPITAL | Age: 26
End: 2021-09-08
Attending: FAMILY MEDICINE
Payer: COMMERCIAL

## 2021-09-08 VITALS
RESPIRATION RATE: 14 BRPM | TEMPERATURE: 97.9 F | HEART RATE: 87 BPM | SYSTOLIC BLOOD PRESSURE: 107 MMHG | DIASTOLIC BLOOD PRESSURE: 57 MMHG | BODY MASS INDEX: 22.14 KG/M2 | OXYGEN SATURATION: 98 % | WEIGHT: 129 LBS

## 2021-09-08 DIAGNOSIS — D69.6 THROMBOCYTOPENIA (H): Primary | ICD-10-CM

## 2021-09-08 DIAGNOSIS — E61.1 IRON DEFICIENCY: ICD-10-CM

## 2021-09-08 PROCEDURE — G0463 HOSPITAL OUTPT CLINIC VISIT: HCPCS

## 2021-09-08 PROCEDURE — 99204 OFFICE O/P NEW MOD 45 MIN: CPT | Performed by: INTERNAL MEDICINE

## 2021-09-08 RX ORDER — FERROUS SULFATE 325(65) MG
325 TABLET, DELAYED RELEASE (ENTERIC COATED) ORAL DAILY
Qty: 90 TABLET | Refills: 1 | Status: SHIPPED | OUTPATIENT
Start: 2021-09-08 | End: 2022-02-11

## 2021-09-08 NOTE — PROGRESS NOTES
[facility] Hematology and Oncology Consult Note      Patient: Yesenia Pickett  MRN: [id number]  Date of Service: Sep 8, 2021      We have been asked by Dr. Lazaro to evaluate Yesenia Pickett for anemia and thrombocytopenia.    Assessment/Plan:    1.  Anemia: Ferritin is low so there is likely an iron deficiency component.  I ordered ferrous sulfate tablets for her to take once daily with food.  Would recommend she stay on this for 6 months.  I told her that if she develops abdominal pain, nausea, or constipation that she should call us and then we could switch to intravenous iron.    2.  Thrombocytopenia: Unsure if this is acute or chronic.  We have lab reading from January 2018 but that was from an emergency room visit when she was ill.  Two more recent tests were during pregnancy.  For now we will continue to monitor throughout her pregnancy.  I do not think anything needs to be further addressed if her platelet count remains above 100,000.  I would like to recheck her platelets in May 2022 to see what her baseline is.  Further evaluation can be done at that time if needed.  Questions were answered.    ECOG Performance  0    Staging History:    Cancer Staging  No matching staging information was found for the patient.    History:    Yesenia is a 26-year-old woman who is referred for an evaluation of anemia and thrombocytopenia.  She is currently pregnant.  Her due date is mid January.  Things have been going well with the pregnancy so far.  No acute complaints today.  No bleeding or bruising.  No fevers, chills, or night sweats.    Past History:  Past Medical History:   Diagnosis Date     Female infertility      Infertility counseling 10/22/2020    Family History   Problem Relation Age of Onset     Heart Defect Sister      No Known Problems Father      No Known Problems Brother      No Known Problems Brother      No Known Problems Sister      No Known Problems Sister       [unfilled] Social History     Socioeconomic  History     Marital status: Single     Spouse name: Not on file     Number of children: Not on file     Years of education: Not on file     Highest education level: Not on file   Occupational History     Not on file   Tobacco Use     Smoking status: Never Smoker     Smokeless tobacco: Never Used   Substance and Sexual Activity     Alcohol use: Not on file     Drug use: Not on file     Sexual activity: Not on file   Other Topics Concern     Not on file   Social History Narrative     Not on file     Social Determinants of Health     Financial Resource Strain:      Difficulty of Paying Living Expenses:    Food Insecurity:      Worried About Running Out of Food in the Last Year:      Ran Out of Food in the Last Year:    Transportation Needs:      Lack of Transportation (Medical):      Lack of Transportation (Non-Medical):    Physical Activity:      Days of Exercise per Week:      Minutes of Exercise per Session:    Stress:      Feeling of Stress :    Social Connections:      Frequency of Communication with Friends and Family:      Frequency of Social Gatherings with Friends and Family:      Attends [employer] Services:      Active Member of Clubs or Organizations:      Attends Club or Organization Meetings:      Marital Status:    Intimate Partner Violence:      Fear of Current or Ex-Partner:      Emotionally Abused:      Physically Abused:      Sexually Abused:         Allergies:    No Known Allergies    Review of Systems:    As above in the history.     Review of Systems otherwise Negative for:  General: chills, fever or night sweats  Psychological: anxiety or depression  Ophthalmic: blurry vision, double vision or loss of vision, vision change  ENT: epistaxis, oral lesions, hearing changes  Hematological and Lymphatic: bleeding, bruising, jaundice, swollen lymph nodes  Endocrine: hot flashes, unexpected weight changes  Respiratory: cough, hemoptysis, orthopnea or shortness of breath/PRUETT  Cardiovascular: chest pain,  edema, palpitations or PND  Gastrointestinal: abdominal pain, blood in stools, change in bowel habits, constipation, diarrhea or nausea/vomiting  Genito-Urinary: change in urinary stream, incontinence, frequency/urgency  Musculoskeletal: joint pain, stiffness, swelling, muscle pain  Neurological: dizziness, headaches, numbness/tingling  Dermatological: lumps and rash    Physical Exam:    /57   Pulse 87   Temp 97.9  F (36.6  C)   Resp 14   Wt 58.5 kg (129 lb)   LMP 2021 (Within Months)   SpO2 98%   BMI 22.14 kg/m      General: patient appears stated age of 26 year old. Nontoxic and in no distress.   HEENT: Head: atraumatic, normocephalic. Sclerae anicteric.  Chest:  Normal respiratory effort  Cardiac:  No edema.   Abdomen: abdomen is non-distended  Extremities: normal tone and muscle bulk.   Skin: no lesions or rash on visible skin. Warm and dry.   CNS: alert and oriented. Grossly non-focal.   Psychiatric: normal mood and affect.     Lab Results:    No results found for this or any previous visit (from the past 168 hour(s)).    Imaging Results:    [facility] US Comprehensive Single    Result Date: 2021          Comprehensive --------------------------------------------------------------------------------------------------------- Pat. Name: MAYI BACA       Study Date:  2021 11:40am Pat. NO:  [id number]        Referring  MD: GAIL WALLACE Site:  [place]       Sonographer: Xochilt Ken RDMS :  1995        Age:   26 --------------------------------------------------------------------------------------------------------- INDICATION --------------------------------------------------------------------------------------------------------- Family history of CHD. Thrombocytopenia. METHOD --------------------------------------------------------------------------------------------------------- Transabdominal ultrasound examination. View: Suboptimal view: limited by fetal position PREGNANCY  --------------------------------------------------------------------------------------------------------- Cast pregnancy. Number of fetuses: 1 DATING ---------------------------------------------------------------------------------------------------------                                          Date                                Details                                                                                      Gest. age                      BRETT LMP                                  4/1/2021                          Cycle: LMP date uncertain                                                          19 w + 4 d                     1/6/2022 Prior assessment               5/25/2021                         GA: 7 w + 3 d                                                                            19 w + 2 d                     1/8/2022 U/S                                   8/16/2021                         based upon AC, BPD, Femur, HC                                                19 w + 4 d                     1/6/2022 Assigned dating                  Dating performed on 08/16/2021, based on the prior assessment (on 05/25/2021)                   19 w + 2 d                     1/8/2022 GENERAL EVALUATION --------------------------------------------------------------------------------------------------------- Cardiac activity present.  bpm. Fetal movements present. Presentation cephalic. Placenta No Previa, > 2 cm from internal os, Anterior. Umbilical cord 3 vessel cord. Amniotic fluid MVP 5.4 cm. FETAL BIOMETRY --------------------------------------------------------------------------------------------------------- Main Fetal Biometry: BPD                                        45.4                    mm                         19w 5d                Hadlock OFD                                        59.7                    mm                         19w 3d                Nicolaides HC                                           169.6                  mm                          19w 4d                Hadlock AC                                          143.0                  mm                          19w 5d        58%        Hadlock Femur                                      29.7                   mm                          19w 1d                Hadlock Humerus                                  28.0                    mm                         19w 0d                Elías Fetal Weight Calculation: EFW                                       293                     g                                     54%        Hadlock EFW (lb,oz)                             0 lb 10                 oz EFW by                                        Hadlock (BPD-HC-AC-FL) Head / Face / Neck Biometry:                                            4.7                     mm CM                                          5.7                     mm Nuchal fold                               3.7                     mm FETAL ANATOMY --------------------------------------------------------------------------------------------------------- The following structures appear abnormal: Head / Neck                         Right choroid plexus: cyst. Left choroid plexus: cyst. The following structures appear normal: Head / Neck                         Cranium. Head size. Head shape. Lateral ventricles. Midline falx. Cavum septi pellucidi. Cerebellum. Cisterna magna. Parenchyma. Thalami.                                            Vermis.                                            Neck. Nuchal fold. Face                                   Lips. Maxilla. Mandible. Orbits. Lens. Heart / Thorax                      4-chamber view. RVOT view. LVOT view. Situs. Aortic arch view. Bicaval view. Ductal arch view. Superior vena cava. Inferior vena cava. 3-vessel                                            view. 3-vessel-trachea view. Cardiac position. Cardiac size.  Cardiac rhythm.                                            Right lung. Left lung. Diaphragm. Abdomen                             Abdominal wall. Cord insertion. Stomach. Kidneys. Bladder. Liver. Bowel. Genitals. Spine                                  Cervical spine. Thoracic spine. Lumbar spine. Sacral spine. Extremities / Skeleton          Right arm. Right hand. Left arm. Left hand. Right leg. Right foot. Left leg. Left foot. The following structures could not be adequately visualized: Face                                   Profile. Nose. Gender: female. MATERNAL STRUCTURES --------------------------------------------------------------------------------------------------------- Cervix                                  Visualized                                            Appearance: Appears Closed                                            Cervical length 34.2 mm Right Ovary                          Visualized Left Ovary                            Visualized RECOMMENDATION --------------------------------------------------------------------------------------------------------- Thank-you for referring your patient for a comprehensive ultrasound. She declined aneuploidy assessment. I discussed the findings on today's ultrasound with the patient. I reviewed the limitations of ultrasound both in detecting aneuploidy and structural abnormalities. We discussed the finding of the unilateral/bilateral choroid plexus cysts(). We discussed that choroid plexus cysts are seen in 1-2.5% of normal pregnancies as an isolated finding with no clinically significant impact. We reviewed that in the setting of her otherwise structurally normal ultrasound today this is likely to be a normal variant and requires no additional follow-up. We also reviewed that this finding does not impact structural brain development or function. Follow-up is recommended in 4-5 weeks to reassess anatomy that was suboptimally seen today. Additionally, a  fetal echocardiogram is recommended due to a family history of congenital cardiac defect (maternal sister). Return to primary provider for continued prenatal care. If you have questions regarding today's evaluation or if we can be of further service, please contact the [facility]. **Fetal anomalies may be present but not detected**     IMPRESSION --------------------------------------------------------------------------------------------------------- 1) Cast intrauterine pregnancy at 19w 2d gestational age. 2) Bilateral choroid plexus cysts are noted. Otherwise, none of the anomalies commonly detected by ultrasound were evident in the detailed fetal anatomic survey, however some views were suboptimal, as described above. 3) Growth parameters and estimated fetal weight were consistent with established dates. 4) The amniotic fluid volume appeared normal. 5) Normal fetal activity for gestational age. 6) On transabdominal imaging the cervix appears long and closed.       Signed by: Cliff Reeder MD

## 2021-09-08 NOTE — LETTER
"    9/8/2021         RE: Yesenia Pickett  [address] [place] [address]        Dear Colleague,    Thank you for referring your patient, Yesenia Pickett, to the [facility]. Please see a copy of my visit note below.    Oncology Rooming Note    September 8, 2021 11:30 AM   Yesenia Pickett is a 26 year old female who presents for:    Chief Complaint   Patient presents with     Oncology Clinic Visit     Thrombocytopenia (H)     Initial Vitals: /57   Pulse 87   Temp 97.9  F (36.6  C)   Resp 14   Wt 58.5 kg (129 lb)   LMP 04/01/2021 (Within Months)   SpO2 98%   BMI 22.14 kg/m   Estimated body mass index is 22.14 kg/m  as calculated from the following:    Height as of 6/24/21: 1.626 m (5' 4\").    Weight as of this encounter: 58.5 kg (129 lb). Body surface area is 1.63 meters squared.  No Pain (0) Comment: Data Unavailable   Patient's last menstrual period was 04/01/2021 (within months).  Allergies reviewed: Yes  Medications reviewed: Yes    Medications: Medication refills not needed today.  Pharmacy name entered into [employer]: [id number] - [place], [place] - [address]    Clinical concerns: None       Mitra Turner              [facility] Hematology and Oncology Consult Note      Patient: Yesenia Pickett  MRN: [id number]  Date of Service: Sep 8, 2021      We have been asked by Dr. Lazaro to evaluate Yesenia Pickett for anemia and thrombocytopenia.    Assessment/Plan:    1.  Anemia: Ferritin is low so there is likely an iron deficiency component.  I ordered ferrous sulfate tablets for her to take once daily with food.  Would recommend she stay on this for 6 months.  I told her that if she develops abdominal pain, nausea, or constipation that she should call us and then we could switch to intravenous iron.    2.  Thrombocytopenia: Unsure if this is acute or chronic.  We have lab reading from January 2018 but that was from an emergency room visit when she was ill.  Two more " recent tests were during pregnancy.  For now we will continue to monitor throughout her pregnancy.  I do not think anything needs to be further addressed if her platelet count remains above 100,000.  I would like to recheck her platelets in May 2022 to see what her baseline is.  Further evaluation can be done at that time if needed.  Questions were answered.    ECOG Performance  0    Staging History:    Cancer Staging  No matching staging information was found for the patient.    History:    Yesenia is a 26-year-old woman who is referred for an evaluation of anemia and thrombocytopenia.  She is currently pregnant.  Her due date is mid January.  Things have been going well with the pregnancy so far.  No acute complaints today.  No bleeding or bruising.  No fevers, chills, or night sweats.    Past History:  Past Medical History:   Diagnosis Date     Female infertility      Infertility counseling 10/22/2020    Family History   Problem Relation Age of Onset     Heart Defect Sister      No Known Problems Father      No Known Problems Brother      No Known Problems Brother      No Known Problems Sister      No Known Problems Sister       [unfilled] Social History     Socioeconomic History     Marital status: Single     Spouse name: Not on file     Number of children: Not on file     Years of education: Not on file     Highest education level: Not on file   Occupational History     Not on file   Tobacco Use     Smoking status: Never Smoker     Smokeless tobacco: Never Used   Substance and Sexual Activity     Alcohol use: Not on file     Drug use: Not on file     Sexual activity: Not on file   Other Topics Concern     Not on file   Social History Narrative     Not on file     Social Determinants of Health     Financial Resource Strain:      Difficulty of Paying Living Expenses:    Food Insecurity:      Worried About Running Out of Food in the Last Year:      Ran Out of Food in the Last Year:    Transportation Needs:      Lack  of Transportation (Medical):      Lack of Transportation (Non-Medical):    Physical Activity:      Days of Exercise per Week:      Minutes of Exercise per Session:    Stress:      Feeling of Stress :    Social Connections:      Frequency of Communication with Friends and Family:      Frequency of Social Gatherings with Friends and Family:      Attends Sikhism Services:      Active Member of Clubs or Organizations:      Attends Club or Organization Meetings:      Marital Status:    Intimate Partner Violence:      Fear of Current or Ex-Partner:      Emotionally Abused:      Physically Abused:      Sexually Abused:         Allergies:    No Known Allergies    Review of Systems:    As above in the history.     Review of Systems otherwise Negative for:  General: chills, fever or night sweats  Psychological: anxiety or depression  Ophthalmic: blurry vision, double vision or loss of vision, vision change  ENT: epistaxis, oral lesions, hearing changes  Hematological and Lymphatic: bleeding, bruising, jaundice, swollen lymph nodes  Endocrine: hot flashes, unexpected weight changes  Respiratory: cough, hemoptysis, orthopnea or shortness of breath/PRUETT  Cardiovascular: chest pain, edema, palpitations or PND  Gastrointestinal: abdominal pain, blood in stools, change in bowel habits, constipation, diarrhea or nausea/vomiting  Genito-Urinary: change in urinary stream, incontinence, frequency/urgency  Musculoskeletal: joint pain, stiffness, swelling, muscle pain  Neurological: dizziness, headaches, numbness/tingling  Dermatological: lumps and rash    Physical Exam:    /57   Pulse 87   Temp 97.9  F (36.6  C)   Resp 14   Wt 58.5 kg (129 lb)   LMP 04/01/2021 (Within Months)   SpO2 98%   BMI 22.14 kg/m      General: patient appears stated age of 26 year old. Nontoxic and in no distress.   HEENT: Head: atraumatic, normocephalic. Sclerae anicteric.  Chest:  Normal respiratory effort  Cardiac:  No edema.   Abdomen: abdomen  is non-distended  Extremities: normal tone and muscle bulk.   Skin: no lesions or rash on visible skin. Warm and dry.   CNS: alert and oriented. Grossly non-focal.   Psychiatric: normal mood and affect.     Lab Results:    No results found for this or any previous visit (from the past 168 hour(s)).    Imaging Results:    [facility] US Comprehensive Single    Result Date: 2021          Comprehensive --------------------------------------------------------------------------------------------------------- Pat. Name: MAYI BACA       Study Date:  2021 11:40am Pat. NO:  [id number]        Referring  MD: GAIL WALLACE Site:  [place]       Sonographer: Xochilt Ken RDMS :  [id number]        Age:   26 --------------------------------------------------------------------------------------------------------- INDICATION --------------------------------------------------------------------------------------------------------- Family history of CHD. Thrombocytopenia. METHOD --------------------------------------------------------------------------------------------------------- Transabdominal ultrasound examination. View: Suboptimal view: limited by fetal position PREGNANCY --------------------------------------------------------------------------------------------------------- Cast pregnancy. Number of fetuses: 1 DATING ---------------------------------------------------------------------------------------------------------                                          Date                                Details                                                                                      Gest. age                      BRETT LMP                                  2021                          Cycle: LMP date uncertain                                                          19 w + 4 d                     2022 Prior assessment               2021                         GA: 7 w + 3 d                                                                             19 w + 2 d                     1/8/2022 U/S                                   8/16/2021                         based upon AC, BPD, Femur, HC                                                19 w + 4 d                     1/6/2022 Assigned dating                  Dating performed on 08/16/2021, based on the prior assessment (on 05/25/2021)                   19 w + 2 d                     1/8/2022 GENERAL EVALUATION --------------------------------------------------------------------------------------------------------- Cardiac activity present.  bpm. Fetal movements present. Presentation cephalic. Placenta No Previa, > 2 cm from internal os, Anterior. Umbilical cord 3 vessel cord. Amniotic fluid MVP 5.4 cm. FETAL BIOMETRY --------------------------------------------------------------------------------------------------------- Main Fetal Biometry: BPD                                        45.4                    mm                         19w 5d                Hadlock OFD                                        59.7                    mm                         19w 3d                Nicolaides HC                                          169.6                  mm                          19w 4d                Hadlock AC                                          143.0                  mm                          19w 5d        58%        Hadlock Femur                                      29.7                   mm                          19w 1d                Hadlock Humerus                                  28.0                    mm                         19w 0d                Elías Fetal Weight Calculation: EFW                                       293                     g                                     54%        Hadlock EFW (lb,oz)                             0 lb 10                 oz EFW by                                        Hadlock  (BPD---FL) Head / Face / Neck Biometry:                                            4.7                     mm CM                                          5.7                     mm Nuchal fold                               3.7                     mm FETAL ANATOMY --------------------------------------------------------------------------------------------------------- The following structures appear abnormal: Head / Neck                         Right choroid plexus: cyst. Left choroid plexus: cyst. The following structures appear normal: Head / Neck                         Cranium. Head size. Head shape. Lateral ventricles. Midline falx. Cavum septi pellucidi. Cerebellum. Cisterna magna. Parenchyma. Thalami.                                            Vermis.                                            Neck. Nuchal fold. Face                                   Lips. Maxilla. Mandible. Orbits. Lens. Heart / Thorax                      4-chamber view. RVOT view. LVOT view. Situs. Aortic arch view. Bicaval view. Ductal arch view. Superior vena cava. Inferior vena cava. 3-vessel                                            view. 3-vessel-trachea view. Cardiac position. Cardiac size. Cardiac rhythm.                                            Right lung. Left lung. Diaphragm. Abdomen                             Abdominal wall. Cord insertion. Stomach. Kidneys. Bladder. Liver. Bowel. Genitals. Spine                                  Cervical spine. Thoracic spine. Lumbar spine. Sacral spine. Extremities / Skeleton          Right arm. Right hand. Left arm. Left hand. Right leg. Right foot. Left leg. Left foot. The following structures could not be adequately visualized: Face                                   Profile. Nose. Gender: female. MATERNAL STRUCTURES --------------------------------------------------------------------------------------------------------- Cervix                                  Visualized                                             Appearance: Appears Closed                                            Cervical length 34.2 mm Right Ovary                          Visualized Left Ovary                            Visualized RECOMMENDATION --------------------------------------------------------------------------------------------------------- Thank-you for referring your patient for a comprehensive ultrasound. She declined aneuploidy assessment. I discussed the findings on today's ultrasound with the patient. I reviewed the limitations of ultrasound both in detecting aneuploidy and structural abnormalities. We discussed the finding of the unilateral/bilateral choroid plexus cysts(). We discussed that choroid plexus cysts are seen in 1-2.5% of normal pregnancies as an isolated finding with no clinically significant impact. We reviewed that in the setting of her otherwise structurally normal ultrasound today this is likely to be a normal variant and requires no additional follow-up. We also reviewed that this finding does not impact structural brain development or function. Follow-up is recommended in 4-5 weeks to reassess anatomy that was suboptimally seen today. Additionally, a fetal echocardiogram is recommended due to a family history of congenital cardiac defect (maternal sister). Return to primary provider for continued prenatal care. If you have questions regarding today's evaluation or if we can be of further service, please contact the [facility]. **Fetal anomalies may be present but not detected**     IMPRESSION --------------------------------------------------------------------------------------------------------- 1) Cast intrauterine pregnancy at 19w 2d gestational age. 2) Bilateral choroid plexus cysts are noted. Otherwise, none of the anomalies commonly detected by ultrasound were evident in the detailed fetal anatomic survey, however some views were suboptimal, as described above.  3) Growth parameters and estimated fetal weight were consistent with established dates. 4) The amniotic fluid volume appeared normal. 5) Normal fetal activity for gestational age. 6) On transabdominal imaging the cervix appears long and closed.       Signed by: Cliff Reeder MD        Again, thank you for allowing me to participate in the care of your patient.        Sincerely,        Cliff Reeder MD

## 2021-09-08 NOTE — PROGRESS NOTES
"Oncology Rooming Note    September 8, 2021 11:30 AM   Yesenia Pickett is a 26 year old female who presents for:    Chief Complaint   Patient presents with     Oncology Clinic Visit     Thrombocytopenia (H)     Initial Vitals: /57   Pulse 87   Temp 97.9  F (36.6  C)   Resp 14   Wt 58.5 kg (129 lb)   LMP 04/01/2021 (Within Months)   SpO2 98%   BMI 22.14 kg/m   Estimated body mass index is 22.14 kg/m  as calculated from the following:    Height as of 6/24/21: 1.626 m (5' 4\").    Weight as of this encounter: 58.5 kg (129 lb). Body surface area is 1.63 meters squared.  No Pain (0) Comment: Data Unavailable   Patient's last menstrual period was 04/01/2021 (within months).  Allergies reviewed: Yes  Medications reviewed: Yes    Medications: Medication refills not needed today.  Pharmacy name entered into [facility]: [id number] - [place], [place] - [address]    Clinical concerns: None       Mitra Turner            "

## 2021-09-17 ENCOUNTER — LAB (OUTPATIENT)
Dept: LAB | Facility: CLINIC | Age: 26
End: 2021-09-17
Payer: COMMERCIAL

## 2021-09-17 DIAGNOSIS — Z11.1 SCREENING FOR TUBERCULOSIS: ICD-10-CM

## 2021-09-17 DIAGNOSIS — Z11.1 SCREENING FOR TUBERCULOSIS: Primary | ICD-10-CM

## 2021-09-17 PROCEDURE — 86481 TB AG RESPONSE T-CELL SUSP: CPT

## 2021-09-17 PROCEDURE — 36415 COLL VENOUS BLD VENIPUNCTURE: CPT

## 2021-09-20 ENCOUNTER — OFFICE VISIT (OUTPATIENT)
Dept: MATERNAL FETAL MEDICINE | Facility: HOSPITAL | Age: 26
End: 2021-09-20
Attending: OBSTETRICS & GYNECOLOGY
Payer: COMMERCIAL

## 2021-09-20 ENCOUNTER — ANCILLARY PROCEDURE (OUTPATIENT)
Dept: ULTRASOUND IMAGING | Facility: HOSPITAL | Age: 26
End: 2021-09-20
Attending: OBSTETRICS & GYNECOLOGY
Payer: COMMERCIAL

## 2021-09-20 DIAGNOSIS — Z82.79 FAMILY HISTORY OF CONGENITAL HEART DISEASE IN SISTER: Primary | ICD-10-CM

## 2021-09-20 DIAGNOSIS — Z82.79 FAMILY HISTORY OF CONGENITAL HEART DISEASE IN SISTER: ICD-10-CM

## 2021-09-20 DIAGNOSIS — O35.9XX0 SUSPECTED FETAL ANOMALY, ANTEPARTUM, SINGLE OR UNSPECIFIED FETUS: ICD-10-CM

## 2021-09-20 LAB
GAMMA INTERFERON BACKGROUND BLD IA-ACNC: 0.07 IU/ML
M TB IFN-G BLD-IMP: NEGATIVE
M TB IFN-G CD4+ BCKGRND COR BLD-ACNC: 3.77 IU/ML
MITOGEN IGNF BCKGRD COR BLD-ACNC: 0.03 IU/ML
MITOGEN IGNF BCKGRD COR BLD-ACNC: 0.06 IU/ML
QUANTIFERON MITOGEN: 3.84 IU/ML
QUANTIFERON NIL TUBE: 0.07 IU/ML
QUANTIFERON TB1 TUBE: 0.1 IU/ML
QUANTIFERON TB2 TUBE: 0.13

## 2021-09-20 PROCEDURE — 99207 PR NO CHARGE LOS: CPT | Performed by: OBSTETRICS & GYNECOLOGY

## 2021-09-20 PROCEDURE — 76816 OB US FOLLOW-UP PER FETUS: CPT

## 2021-09-20 PROCEDURE — 93325 DOPPLER ECHO COLOR FLOW MAPG: CPT

## 2021-09-22 ENCOUNTER — TELEPHONE (OUTPATIENT)
Dept: ONCOLOGY | Facility: HOSPITAL | Age: 26
End: 2021-09-22

## 2021-09-22 NOTE — TELEPHONE ENCOUNTER
Call placed to patient to see how she is tolerating the oral iron prescribed. LM for patient to call if she has concerns, otherwise return as directed.

## 2021-09-29 ENCOUNTER — PRENATAL OFFICE VISIT (OUTPATIENT)
Dept: FAMILY MEDICINE | Facility: CLINIC | Age: 26
End: 2021-09-29
Payer: COMMERCIAL

## 2021-09-29 VITALS
SYSTOLIC BLOOD PRESSURE: 125 MMHG | WEIGHT: 135 LBS | BODY MASS INDEX: 23.17 KG/M2 | DIASTOLIC BLOOD PRESSURE: 80 MMHG | RESPIRATION RATE: 20 BRPM | HEART RATE: 114 BPM

## 2021-09-29 DIAGNOSIS — Z34.82 ENCOUNTER FOR SUPERVISION OF OTHER NORMAL PREGNANCY IN SECOND TRIMESTER: Primary | ICD-10-CM

## 2021-09-29 LAB
ALBUMIN UR-MCNC: NEGATIVE MG/DL
GLUCOSE 1H P 50 G GLC PO SERPL-MCNC: 97 MG/DL (ref 70–129)
GLUCOSE UR STRIP-MCNC: NEGATIVE MG/DL
HGB BLD-MCNC: 10.8 G/DL (ref 11.7–15.7)
KETONES UR STRIP-MCNC: NEGATIVE MG/DL

## 2021-09-29 PROCEDURE — 36415 COLL VENOUS BLD VENIPUNCTURE: CPT | Performed by: FAMILY MEDICINE

## 2021-09-29 PROCEDURE — 81003 URINALYSIS AUTO W/O SCOPE: CPT | Performed by: FAMILY MEDICINE

## 2021-09-29 PROCEDURE — 86780 TREPONEMA PALLIDUM: CPT | Performed by: FAMILY MEDICINE

## 2021-09-29 PROCEDURE — 99207 PR PRENATAL VISIT: CPT | Performed by: FAMILY MEDICINE

## 2021-09-29 PROCEDURE — 82952 GTT-ADDED SAMPLES: CPT | Performed by: FAMILY MEDICINE

## 2021-09-29 NOTE — PROGRESS NOTES
Due to language barrier, an  was present during the history-taking and subsequent discussion (and the physical exam) with this patient.     No ctx, lof, bleeding, or discharge.  No HA or vision changes.  Good FM : Yes    Exam:   See flowsheet  GEN:  26 year old female sitting comfortably in no apparent distress.   HEENT:  no scleral icterus, buccal mucosa moist  CHEST/LUNG: No respiratory distress, unlabored breathing  ABD:  Soft, non-tender, Gravid uterus  PSYCH:  Mood and affect appropriate    Yesenia was seen today for prenatal care.    Diagnoses and all orders for this visit:    Encounter for supervision of other normal pregnancy in second trimester  Comments:  labs today  Orders:  -     Urinalysis, OB Screen; Future  -     Urinalysis, OB Screen  -     Glucose tolerance, gest screen, 1 hour; Future  -     OB hemoglobin; Future  -     Treponema Abs w Reflex to RPR and Titer; Future    Has met with MFM and cleared anatomy.  Had normal fetal echo.  All reassuring.  This was for family history of congenital heart disease.  Also met with a hematologist for concerns of her thrombocytopenia.  Suspecting gestational thrombocytopenia.  Sounds like the 2 other instances on the chart were from when she had acute illnesses so suspected to be low from that.  We will continue to monitor and to follow labs PP.  Discussed continue normal routine prenatal care at this time.       Kyleigh Lazaro, DO

## 2021-09-30 LAB — T PALLIDUM AB SER QL: NEGATIVE

## 2021-10-02 ENCOUNTER — HEALTH MAINTENANCE LETTER (OUTPATIENT)
Age: 26
End: 2021-10-02

## 2021-10-20 ENCOUNTER — PRENATAL OFFICE VISIT (OUTPATIENT)
Dept: FAMILY MEDICINE | Facility: CLINIC | Age: 26
End: 2021-10-20
Payer: COMMERCIAL

## 2021-10-20 VITALS
SYSTOLIC BLOOD PRESSURE: 122 MMHG | WEIGHT: 146 LBS | HEART RATE: 84 BPM | DIASTOLIC BLOOD PRESSURE: 75 MMHG | BODY MASS INDEX: 25.06 KG/M2 | RESPIRATION RATE: 16 BRPM | TEMPERATURE: 97.8 F

## 2021-10-20 DIAGNOSIS — D69.6 THROMBOCYTOPENIA (H): ICD-10-CM

## 2021-10-20 DIAGNOSIS — Z34.83 ENCOUNTER FOR SUPERVISION OF OTHER NORMAL PREGNANCY IN THIRD TRIMESTER: Primary | ICD-10-CM

## 2021-10-20 DIAGNOSIS — Z82.79 FAMILY HISTORY OF CONGENITAL HEART DISEASE IN SISTER: ICD-10-CM

## 2021-10-20 DIAGNOSIS — D64.9 ANEMIA, UNSPECIFIED TYPE: ICD-10-CM

## 2021-10-20 PROCEDURE — 90715 TDAP VACCINE 7 YRS/> IM: CPT | Performed by: FAMILY MEDICINE

## 2021-10-20 PROCEDURE — 81003 URINALYSIS AUTO W/O SCOPE: CPT | Performed by: FAMILY MEDICINE

## 2021-10-20 PROCEDURE — 99207 PR PRENATAL VISIT: CPT | Performed by: FAMILY MEDICINE

## 2021-10-20 PROCEDURE — 90686 IIV4 VACC NO PRSV 0.5 ML IM: CPT | Performed by: FAMILY MEDICINE

## 2021-10-20 PROCEDURE — 90471 IMMUNIZATION ADMIN: CPT | Performed by: FAMILY MEDICINE

## 2021-10-20 PROCEDURE — 90472 IMMUNIZATION ADMIN EACH ADD: CPT | Performed by: FAMILY MEDICINE

## 2021-10-20 RX ORDER — B-COMPLEX WITH VITAMIN C
1 TABLET ORAL
COMMUNITY
Start: 2021-05-18 | End: 2021-10-20

## 2021-10-20 NOTE — PROGRESS NOTES
Due to language barrier, an  was present during the history-taking and subsequent discussion (and the physical exam) with this patient.     No ctx, lof, bleeding, or discharge.  No HA or vision changes.  Good FM : yes    Exam:   See flowsheet  GEN:  26 year old female sitting comfortably in no apparent distress.   HEENT:  no scleral icterus, buccal mucosa moist  CHEST/LUNG: No respiratory distress, unlabored breathing  ABD:  Soft, non-tender, Gravid uterus  PSYCH:  Mood and affect appropriate    Yesenia was seen today for prenatal care.    Diagnoses and all orders for this visit:    Encounter for supervision of other normal pregnancy in third trimester  -     TDAP VACCINE (Adacel, Boostrix)  [[id number]]  -     Urinalysis, OB Screen    Thrombocytopenia (H)  Comments:  likely gestational. check cbc next visit    Family history of congenital heart disease in sister  Comments:  normal level II US and fetal echo    Anemia, unspecified type  Comments:  continue iron    Other orders  -     REVIEW OF HEALTH MAINTENANCE PROTOCOL ORDERS  -     LA FLU VAC PRESRV FREE QUAD SPLIT VIR IM  MONTHS IM          Handout given    Kyleigh Lazaro DO

## 2021-11-10 ENCOUNTER — PRENATAL OFFICE VISIT (OUTPATIENT)
Dept: FAMILY MEDICINE | Facility: CLINIC | Age: 26
End: 2021-11-10
Payer: COMMERCIAL

## 2021-11-10 VITALS
SYSTOLIC BLOOD PRESSURE: 114 MMHG | HEART RATE: 97 BPM | OXYGEN SATURATION: 98 % | DIASTOLIC BLOOD PRESSURE: 78 MMHG | WEIGHT: 154 LBS | TEMPERATURE: 97.1 F | BODY MASS INDEX: 26.43 KG/M2

## 2021-11-10 DIAGNOSIS — D64.9 ANEMIA, UNSPECIFIED TYPE: ICD-10-CM

## 2021-11-10 DIAGNOSIS — Z34.83 ENCOUNTER FOR SUPERVISION OF OTHER NORMAL PREGNANCY IN THIRD TRIMESTER: Primary | ICD-10-CM

## 2021-11-10 DIAGNOSIS — D69.6 THROMBOCYTOPENIA (H): ICD-10-CM

## 2021-11-10 LAB
ERYTHROCYTE [DISTWIDTH] IN BLOOD BY AUTOMATED COUNT: 12.2 % (ref 10–15)
HCT VFR BLD AUTO: 35 % (ref 35–47)
HGB BLD-MCNC: 11.4 G/DL (ref 11.7–15.7)
MCH RBC QN AUTO: 30.8 PG (ref 26.5–33)
MCHC RBC AUTO-ENTMCNC: 32.6 G/DL (ref 31.5–36.5)
MCV RBC AUTO: 95 FL (ref 78–100)
PLATELET # BLD AUTO: 140 10E3/UL (ref 150–450)
RBC # BLD AUTO: 3.7 10E6/UL (ref 3.8–5.2)
WBC # BLD AUTO: 8.7 10E3/UL (ref 4–11)

## 2021-11-10 PROCEDURE — 99207 PR PRENATAL VISIT: CPT | Performed by: FAMILY MEDICINE

## 2021-11-10 PROCEDURE — 36415 COLL VENOUS BLD VENIPUNCTURE: CPT | Performed by: FAMILY MEDICINE

## 2021-11-10 PROCEDURE — 85027 COMPLETE CBC AUTOMATED: CPT | Performed by: FAMILY MEDICINE

## 2021-11-10 NOTE — PROGRESS NOTES
Due to language barrier, an  was present during the history-taking and subsequent discussion (and the physical exam) with this patient.     No ctx, lof, bleeding, or discharge.  No HA or vision changes.  Good FM : yes    Exam:   See flowsheet  GEN:  26 year old female sitting comfortably in no apparent distress.   HEENT:  no scleral icterus, buccal mucosa moist  CHEST/LUNG: No respiratory distress, unlabored breathing  ABD:  Soft, non-tender, Gravid uterus  PSYCH:  Mood and affect appropriate    Yesenia was seen today for routine ob.    Diagnoses and all orders for this visit:    Encounter for supervision of other normal pregnancy in third trimester  Comments:  Reviewed her 41 pound weight gain so far- it's in excess.  We reviewed healthy diet and exercise during pregnancy.  She states she gained 60 pounds in her first pregnancy.     Thrombocytopenia (H)  Comments:  recheck labs today  Orders:  -     CBC with platelets; Future  -     CBC with platelets    Anemia, unspecified type  -     CBC with platelets; Future  -     CBC with platelets      Plans to breast feed- needs pump  Pain control didn't get anything last time. Might be interested this time.

## 2021-11-23 ENCOUNTER — PRENATAL OFFICE VISIT (OUTPATIENT)
Dept: FAMILY MEDICINE | Facility: CLINIC | Age: 26
End: 2021-11-23
Payer: COMMERCIAL

## 2021-11-23 VITALS
WEIGHT: 154 LBS | BODY MASS INDEX: 26.43 KG/M2 | SYSTOLIC BLOOD PRESSURE: 115 MMHG | TEMPERATURE: 98.3 F | DIASTOLIC BLOOD PRESSURE: 73 MMHG | HEART RATE: 96 BPM | RESPIRATION RATE: 14 BRPM

## 2021-11-23 DIAGNOSIS — Z34.83 ENCOUNTER FOR SUPERVISION OF OTHER NORMAL PREGNANCY IN THIRD TRIMESTER: Primary | ICD-10-CM

## 2021-11-23 DIAGNOSIS — D69.6 THROMBOCYTOPENIA (H): ICD-10-CM

## 2021-11-23 DIAGNOSIS — D64.9 ANEMIA, UNSPECIFIED TYPE: ICD-10-CM

## 2021-11-23 PROCEDURE — 99207 PR PRENATAL VISIT: CPT | Performed by: FAMILY MEDICINE

## 2021-11-23 NOTE — PROGRESS NOTES
Due to language barrier, an  was present during the history-taking and subsequent discussion (and the physical exam) with this patient.     No ctx, lof, bleeding, or discharge.  No HA or vision changes.  Good FM : yes    Exam:   See flowsheet  GEN:  26 year old female sitting comfortably in no apparent distress.   HEENT:  no scleral icterus, buccal mucosa moist  CHEST/LUNG: No respiratory distress, unlabored breathing  ABD:  Soft, non-tender, Gravid uterus  PSYCH:  Mood and affect appropriate    Yesenia was seen today for prenatal care.    Diagnoses and all orders for this visit:    Encounter for supervision of other normal pregnancy in third trimester    Thrombocytopenia (H)  Comments:  stable    Anemia, unspecified type  Comments:  improved    PPBC: declines

## 2021-11-27 ENCOUNTER — HEALTH MAINTENANCE LETTER (OUTPATIENT)
Age: 26
End: 2021-11-27

## 2021-12-07 ENCOUNTER — PRENATAL OFFICE VISIT (OUTPATIENT)
Dept: FAMILY MEDICINE | Facility: CLINIC | Age: 26
End: 2021-12-07
Payer: COMMERCIAL

## 2021-12-07 VITALS
SYSTOLIC BLOOD PRESSURE: 118 MMHG | HEART RATE: 80 BPM | TEMPERATURE: 98.3 F | BODY MASS INDEX: 27.12 KG/M2 | DIASTOLIC BLOOD PRESSURE: 72 MMHG | RESPIRATION RATE: 14 BRPM | WEIGHT: 158 LBS

## 2021-12-07 DIAGNOSIS — Z75.8 LANGUAGE BARRIER AFFECTING HEALTH CARE: ICD-10-CM

## 2021-12-07 DIAGNOSIS — Z60.3 LANGUAGE BARRIER AFFECTING HEALTH CARE: ICD-10-CM

## 2021-12-07 DIAGNOSIS — Z34.83 ENCOUNTER FOR SUPERVISION OF OTHER NORMAL PREGNANCY IN THIRD TRIMESTER: Primary | ICD-10-CM

## 2021-12-07 DIAGNOSIS — K59.00 CONSTIPATION, UNSPECIFIED CONSTIPATION TYPE: ICD-10-CM

## 2021-12-07 DIAGNOSIS — O26.03 EXCESSIVE WEIGHT GAIN DURING PREGNANCY IN THIRD TRIMESTER: ICD-10-CM

## 2021-12-07 PROCEDURE — 99207 PR PRENATAL VISIT: CPT | Performed by: FAMILY MEDICINE

## 2021-12-07 RX ORDER — DOCUSATE SODIUM 100 MG/1
100 CAPSULE, LIQUID FILLED ORAL 2 TIMES DAILY PRN
Qty: 60 CAPSULE | Refills: 0 | Status: SHIPPED | OUTPATIENT
Start: 2021-12-07 | End: 2022-02-11

## 2021-12-07 RX ORDER — POLYETHYLENE GLYCOL 3350 17 G/17G
1 POWDER, FOR SOLUTION ORAL DAILY
Qty: 20 EACH | Refills: 0 | Status: SHIPPED | OUTPATIENT
Start: 2021-12-07 | End: 2022-02-11

## 2021-12-07 SDOH — SOCIAL STABILITY - SOCIAL INSECURITY: ACCULTURATION DIFFICULTY: Z60.3

## 2021-12-07 NOTE — PROGRESS NOTES
Due to language barrier, an  was present during the history-taking and subsequent discussion (and the physical exam) with this patient.     No lof, bleeding, or discharge.  No HA or vision changes.  A couple contractions last weekend but then stopped.  Good FM : yes  Constipated.  Was in her last pregnancy as well received a powder to help would like prescription.    Exam:   See flowsheet  GEN:  26 year old female sitting comfortably in no apparent distress.   HEENT:  no scleral icterus, buccal mucosa moist  CHEST/LUNG: No respiratory distress, unlabored breathing  ABD:  Soft, non-tender, Gravid uterus  PSYCH:  Mood and affect appropriate    Yesenia was seen today for prenatal care.    Diagnoses and all orders for this visit:    Encounter for supervision of other normal pregnancy in third trimester    Language barrier affecting health care    Constipation, unspecified constipation type: Reviewed different medications 1 stool softener, one laxative.  Use as needed  -     docusate sodium (COLACE) 100 MG capsule; Take 1 capsule (100 mg) by mouth 2 times daily as needed for constipation  -     polyethylene glycol (MIRALAX) 17 g packet; Take 17 g by mouth daily    Excessive weight gain during pregnancy in third trimester         General

## 2021-12-15 ENCOUNTER — PRENATAL OFFICE VISIT (OUTPATIENT)
Dept: FAMILY MEDICINE | Facility: CLINIC | Age: 26
End: 2021-12-15
Payer: COMMERCIAL

## 2021-12-15 VITALS
SYSTOLIC BLOOD PRESSURE: 114 MMHG | WEIGHT: 161 LBS | RESPIRATION RATE: 12 BRPM | BODY MASS INDEX: 27.64 KG/M2 | TEMPERATURE: 98.4 F | HEART RATE: 102 BPM | DIASTOLIC BLOOD PRESSURE: 73 MMHG

## 2021-12-15 DIAGNOSIS — Z34.83 ENCOUNTER FOR SUPERVISION OF OTHER NORMAL PREGNANCY IN THIRD TRIMESTER: Primary | ICD-10-CM

## 2021-12-15 PROCEDURE — 99207 PR PRENATAL VISIT: CPT | Performed by: FAMILY MEDICINE

## 2021-12-15 PROCEDURE — 87653 STREP B DNA AMP PROBE: CPT | Performed by: FAMILY MEDICINE

## 2021-12-15 NOTE — PROGRESS NOTES
Due to language barrier, an  was present during the history-taking and subsequent discussion (and the physical exam) with this patient.     No lof, bleeding, or discharge.  No HA or vision changes.  Good FM : yes  A few contractions here and there.     Exam:   See flowsheet  GEN:  26 year old female sitting comfortably in no apparent distress.   HEENT:  no scleral icterus, buccal mucosa moist   CHEST/LUNG: No respiratory distress, unlabored breathing  ABD:  Soft, non-tender, Gravid uterus  PSYCH:  Mood and affect appropriate    Yesenia was seen today for prenatal care.    Diagnoses and all orders for this visit:    Encounter for supervision of other normal pregnancy in third trimester  -     Group B strep PCR    Postpartum care and examination of lactating mother  -     Breast Pump Order for DME - ONLY FOR DME

## 2021-12-17 LAB
GP B STREP DNA SPEC QL NAA+PROBE: NEGATIVE
PATIENT PENICILLIN, AMOXICILLIN, CEPHALOSPORINS ALLERGY: NO

## 2021-12-18 ENCOUNTER — HOSPITAL ENCOUNTER (INPATIENT)
Facility: HOSPITAL | Age: 26
LOS: 1 days | Discharge: HOME | End: 2021-12-19
Attending: FAMILY MEDICINE | Admitting: FAMILY MEDICINE
Payer: COMMERCIAL

## 2021-12-18 PROBLEM — U07.1 LAB TEST POSITIVE FOR DETECTION OF COVID-19 VIRUS: Status: ACTIVE | Noted: 2021-12-18

## 2021-12-18 PROBLEM — Z36.89 ENCOUNTER FOR TRIAGE IN PREGNANT PATIENT: Status: ACTIVE | Noted: 2021-12-18

## 2021-12-18 PROBLEM — Z36.89 ENCOUNTER FOR TRIAGE IN PREGNANT PATIENT: Status: RESOLVED | Noted: 2021-12-18 | Resolved: 2021-12-18

## 2021-12-18 LAB
ABO/RH(D): NORMAL
ANTIBODY SCREEN: NEGATIVE
BASOPHILS # BLD AUTO: 0 10E3/UL (ref 0–0.2)
BASOPHILS NFR BLD AUTO: 0 %
EOSINOPHIL # BLD AUTO: 0.1 10E3/UL (ref 0–0.7)
EOSINOPHIL NFR BLD AUTO: 1 %
ERYTHROCYTE [DISTWIDTH] IN BLOOD BY AUTOMATED COUNT: 12.3 % (ref 10–15)
HCT VFR BLD AUTO: 35.8 % (ref 35–47)
HGB BLD-MCNC: 12.1 G/DL (ref 11.7–15.7)
HOLD SPECIMEN: NORMAL
HOLD SPECIMEN: NORMAL
IMM GRANULOCYTES # BLD: 0.1 10E3/UL
IMM GRANULOCYTES NFR BLD: 1 %
LYMPHOCYTES # BLD AUTO: 2.1 10E3/UL (ref 0.8–5.3)
LYMPHOCYTES NFR BLD AUTO: 15 %
MCH RBC QN AUTO: 31 PG (ref 26.5–33)
MCHC RBC AUTO-ENTMCNC: 33.8 G/DL (ref 31.5–36.5)
MCV RBC AUTO: 92 FL (ref 78–100)
MONOCYTES # BLD AUTO: 1.1 10E3/UL (ref 0–1.3)
MONOCYTES NFR BLD AUTO: 8 %
NEUTROPHILS # BLD AUTO: 10.2 10E3/UL (ref 1.6–8.3)
NEUTROPHILS NFR BLD AUTO: 75 %
NRBC # BLD AUTO: 0 10E3/UL
NRBC BLD AUTO-RTO: 0 /100
PLATELET # BLD AUTO: 137 10E3/UL (ref 150–450)
RBC # BLD AUTO: 3.9 10E6/UL (ref 3.8–5.2)
SARS-COV-2 RNA RESP QL NAA+PROBE: POSITIVE
SPECIMEN EXPIRATION DATE: NORMAL
WBC # BLD AUTO: 13.4 10E3/UL (ref 4–11)

## 2021-12-18 PROCEDURE — 87635 SARS-COV-2 COVID-19 AMP PRB: CPT | Performed by: FAMILY MEDICINE

## 2021-12-18 PROCEDURE — 250N000013 HC RX MED GY IP 250 OP 250 PS 637: Performed by: FAMILY MEDICINE

## 2021-12-18 PROCEDURE — 85025 COMPLETE CBC W/AUTO DIFF WBC: CPT | Performed by: FAMILY MEDICINE

## 2021-12-18 PROCEDURE — 120N000001 HC R&B MED SURG/OB

## 2021-12-18 PROCEDURE — 250N000009 HC RX 250: Performed by: FAMILY MEDICINE

## 2021-12-18 PROCEDURE — 0UQMXZZ REPAIR VULVA, EXTERNAL APPROACH: ICD-10-PCS | Performed by: FAMILY MEDICINE

## 2021-12-18 PROCEDURE — 250N000011 HC RX IP 250 OP 636

## 2021-12-18 PROCEDURE — 0HQ9XZZ REPAIR PERINEUM SKIN, EXTERNAL APPROACH: ICD-10-PCS | Performed by: FAMILY MEDICINE

## 2021-12-18 PROCEDURE — 722N000001 HC LABOR CARE VAGINAL DELIVERY SINGLE

## 2021-12-18 PROCEDURE — 86901 BLOOD TYPING SEROLOGIC RH(D): CPT | Performed by: FAMILY MEDICINE

## 2021-12-18 PROCEDURE — 59400 OBSTETRICAL CARE: CPT | Performed by: FAMILY MEDICINE

## 2021-12-18 PROCEDURE — 36415 COLL VENOUS BLD VENIPUNCTURE: CPT | Performed by: FAMILY MEDICINE

## 2021-12-18 PROCEDURE — 86780 TREPONEMA PALLIDUM: CPT | Performed by: FAMILY MEDICINE

## 2021-12-18 RX ORDER — OXYTOCIN/0.9 % SODIUM CHLORIDE 30/500 ML
100-340 PLASTIC BAG, INJECTION (ML) INTRAVENOUS CONTINUOUS PRN
Status: DISCONTINUED | OUTPATIENT
Start: 2021-12-18 | End: 2021-12-19 | Stop reason: HOSPADM

## 2021-12-18 RX ORDER — MISOPROSTOL 200 UG/1
400 TABLET ORAL
Status: DISCONTINUED | OUTPATIENT
Start: 2021-12-18 | End: 2021-12-19 | Stop reason: HOSPADM

## 2021-12-18 RX ORDER — NALOXONE HYDROCHLORIDE 0.4 MG/ML
0.2 INJECTION, SOLUTION INTRAMUSCULAR; INTRAVENOUS; SUBCUTANEOUS
Status: DISCONTINUED | OUTPATIENT
Start: 2021-12-18 | End: 2021-12-18 | Stop reason: HOSPADM

## 2021-12-18 RX ORDER — OXYTOCIN/0.9 % SODIUM CHLORIDE 30/500 ML
340 PLASTIC BAG, INJECTION (ML) INTRAVENOUS CONTINUOUS PRN
Status: DISCONTINUED | OUTPATIENT
Start: 2021-12-18 | End: 2021-12-18 | Stop reason: HOSPADM

## 2021-12-18 RX ORDER — NALOXONE HYDROCHLORIDE 0.4 MG/ML
0.4 INJECTION, SOLUTION INTRAMUSCULAR; INTRAVENOUS; SUBCUTANEOUS
Status: DISCONTINUED | OUTPATIENT
Start: 2021-12-18 | End: 2021-12-18 | Stop reason: HOSPADM

## 2021-12-18 RX ORDER — MISOPROSTOL 200 UG/1
800 TABLET ORAL
Status: DISCONTINUED | OUTPATIENT
Start: 2021-12-18 | End: 2021-12-19 | Stop reason: HOSPADM

## 2021-12-18 RX ORDER — PROCHLORPERAZINE 25 MG
25 SUPPOSITORY, RECTAL RECTAL EVERY 12 HOURS PRN
Status: DISCONTINUED | OUTPATIENT
Start: 2021-12-18 | End: 2021-12-18 | Stop reason: HOSPADM

## 2021-12-18 RX ORDER — MODIFIED LANOLIN
OINTMENT (GRAM) TOPICAL
Status: DISCONTINUED | OUTPATIENT
Start: 2021-12-18 | End: 2021-12-19 | Stop reason: HOSPADM

## 2021-12-18 RX ORDER — IBUPROFEN 800 MG/1
800 TABLET, FILM COATED ORAL EVERY 6 HOURS PRN
Status: DISCONTINUED | OUTPATIENT
Start: 2021-12-18 | End: 2021-12-19 | Stop reason: HOSPADM

## 2021-12-18 RX ORDER — IBUPROFEN 600 MG/1
600 TABLET, FILM COATED ORAL
Status: DISCONTINUED | OUTPATIENT
Start: 2021-12-18 | End: 2021-12-19 | Stop reason: HOSPADM

## 2021-12-18 RX ORDER — OXYTOCIN 10 [USP'U]/ML
10 INJECTION, SOLUTION INTRAMUSCULAR; INTRAVENOUS
Status: DISCONTINUED | OUTPATIENT
Start: 2021-12-18 | End: 2021-12-18 | Stop reason: HOSPADM

## 2021-12-18 RX ORDER — DOCUSATE SODIUM 100 MG/1
100 CAPSULE, LIQUID FILLED ORAL DAILY
Status: DISCONTINUED | OUTPATIENT
Start: 2021-12-18 | End: 2021-12-19 | Stop reason: HOSPADM

## 2021-12-18 RX ORDER — METOCLOPRAMIDE 10 MG/1
10 TABLET ORAL EVERY 6 HOURS PRN
Status: DISCONTINUED | OUTPATIENT
Start: 2021-12-18 | End: 2021-12-18 | Stop reason: HOSPADM

## 2021-12-18 RX ORDER — METOCLOPRAMIDE HYDROCHLORIDE 5 MG/ML
10 INJECTION INTRAMUSCULAR; INTRAVENOUS EVERY 6 HOURS PRN
Status: DISCONTINUED | OUTPATIENT
Start: 2021-12-18 | End: 2021-12-18 | Stop reason: HOSPADM

## 2021-12-18 RX ORDER — HYDROCORTISONE 2.5 %
CREAM (GRAM) TOPICAL 3 TIMES DAILY PRN
Status: DISCONTINUED | OUTPATIENT
Start: 2021-12-18 | End: 2021-12-19 | Stop reason: HOSPADM

## 2021-12-18 RX ORDER — LIDOCAINE 40 MG/G
CREAM TOPICAL
Status: DISCONTINUED | OUTPATIENT
Start: 2021-12-18 | End: 2021-12-18 | Stop reason: HOSPADM

## 2021-12-18 RX ORDER — BISACODYL 10 MG
10 SUPPOSITORY, RECTAL RECTAL DAILY PRN
Status: DISCONTINUED | OUTPATIENT
Start: 2021-12-18 | End: 2021-12-19 | Stop reason: HOSPADM

## 2021-12-18 RX ORDER — LIDOCAINE HYDROCHLORIDE 20 MG/ML
SOLUTION OROPHARYNGEAL
Status: DISCONTINUED
Start: 2021-12-18 | End: 2021-12-18 | Stop reason: WASHOUT

## 2021-12-18 RX ORDER — PROCHLORPERAZINE MALEATE 10 MG
10 TABLET ORAL EVERY 6 HOURS PRN
Status: DISCONTINUED | OUTPATIENT
Start: 2021-12-18 | End: 2021-12-18 | Stop reason: HOSPADM

## 2021-12-18 RX ORDER — KETOROLAC TROMETHAMINE 30 MG/ML
30 INJECTION, SOLUTION INTRAMUSCULAR; INTRAVENOUS
Status: DISCONTINUED | OUTPATIENT
Start: 2021-12-18 | End: 2021-12-19 | Stop reason: HOSPADM

## 2021-12-18 RX ORDER — OXYTOCIN 10 [USP'U]/ML
INJECTION, SOLUTION INTRAMUSCULAR; INTRAVENOUS
Status: COMPLETED
Start: 2021-12-18 | End: 2021-12-18

## 2021-12-18 RX ORDER — METHYLERGONOVINE MALEATE 0.2 MG/ML
200 INJECTION INTRAVENOUS
Status: DISCONTINUED | OUTPATIENT
Start: 2021-12-18 | End: 2021-12-18 | Stop reason: HOSPADM

## 2021-12-18 RX ORDER — CARBOPROST TROMETHAMINE 250 UG/ML
250 INJECTION, SOLUTION INTRAMUSCULAR
Status: DISCONTINUED | OUTPATIENT
Start: 2021-12-18 | End: 2021-12-19 | Stop reason: HOSPADM

## 2021-12-18 RX ORDER — MISOPROSTOL 200 UG/1
800 TABLET ORAL
Status: DISCONTINUED | OUTPATIENT
Start: 2021-12-18 | End: 2021-12-18 | Stop reason: HOSPADM

## 2021-12-18 RX ORDER — METHYLERGONOVINE MALEATE 0.2 MG/ML
200 INJECTION INTRAVENOUS
Status: DISCONTINUED | OUTPATIENT
Start: 2021-12-18 | End: 2021-12-19 | Stop reason: HOSPADM

## 2021-12-18 RX ORDER — MISOPROSTOL 200 UG/1
TABLET ORAL
Status: DISCONTINUED
Start: 2021-12-18 | End: 2021-12-18 | Stop reason: WASHOUT

## 2021-12-18 RX ORDER — FENTANYL CITRATE 50 UG/ML
50-100 INJECTION, SOLUTION INTRAMUSCULAR; INTRAVENOUS
Status: DISCONTINUED | OUTPATIENT
Start: 2021-12-18 | End: 2021-12-18 | Stop reason: HOSPADM

## 2021-12-18 RX ORDER — ONDANSETRON 2 MG/ML
4 INJECTION INTRAMUSCULAR; INTRAVENOUS EVERY 6 HOURS PRN
Status: DISCONTINUED | OUTPATIENT
Start: 2021-12-18 | End: 2021-12-18 | Stop reason: HOSPADM

## 2021-12-18 RX ORDER — LIDOCAINE HYDROCHLORIDE 10 MG/ML
INJECTION, SOLUTION EPIDURAL; INFILTRATION; INTRACAUDAL; PERINEURAL
Status: DISPENSED
Start: 2021-12-18 | End: 2021-12-18

## 2021-12-18 RX ORDER — OXYTOCIN 10 [USP'U]/ML
10 INJECTION, SOLUTION INTRAMUSCULAR; INTRAVENOUS
Status: DISCONTINUED | OUTPATIENT
Start: 2021-12-18 | End: 2021-12-19 | Stop reason: HOSPADM

## 2021-12-18 RX ORDER — CARBOPROST TROMETHAMINE 250 UG/ML
250 INJECTION, SOLUTION INTRAMUSCULAR
Status: DISCONTINUED | OUTPATIENT
Start: 2021-12-18 | End: 2021-12-18 | Stop reason: HOSPADM

## 2021-12-18 RX ORDER — ONDANSETRON 4 MG/1
4 TABLET, ORALLY DISINTEGRATING ORAL EVERY 6 HOURS PRN
Status: DISCONTINUED | OUTPATIENT
Start: 2021-12-18 | End: 2021-12-18 | Stop reason: HOSPADM

## 2021-12-18 RX ORDER — OXYTOCIN/0.9 % SODIUM CHLORIDE 30/500 ML
340 PLASTIC BAG, INJECTION (ML) INTRAVENOUS CONTINUOUS PRN
Status: DISCONTINUED | OUTPATIENT
Start: 2021-12-18 | End: 2021-12-19 | Stop reason: HOSPADM

## 2021-12-18 RX ORDER — MISOPROSTOL 200 UG/1
400 TABLET ORAL
Status: DISCONTINUED | OUTPATIENT
Start: 2021-12-18 | End: 2021-12-18 | Stop reason: HOSPADM

## 2021-12-18 RX ORDER — ACETAMINOPHEN 325 MG/1
650 TABLET ORAL EVERY 4 HOURS PRN
Status: DISCONTINUED | OUTPATIENT
Start: 2021-12-18 | End: 2021-12-19 | Stop reason: HOSPADM

## 2021-12-18 RX ADMIN — ACETAMINOPHEN 650 MG: 325 TABLET ORAL at 19:07

## 2021-12-18 RX ADMIN — IBUPROFEN 800 MG: 800 TABLET, FILM COATED ORAL at 12:55

## 2021-12-18 RX ADMIN — OXYTOCIN 10 UNITS: 10 INJECTION INTRAVENOUS at 03:14

## 2021-12-18 RX ADMIN — ACETAMINOPHEN 650 MG: 325 TABLET ORAL at 12:55

## 2021-12-18 RX ADMIN — LIDOCAINE HYDROCHLORIDE 20 ML: 10 INJECTION, SOLUTION EPIDURAL; INFILTRATION; INTRACAUDAL; PERINEURAL at 03:16

## 2021-12-18 RX ADMIN — IBUPROFEN 600 MG: 600 TABLET, FILM COATED ORAL at 05:37

## 2021-12-18 RX ADMIN — DOCUSATE SODIUM 100 MG: 100 CAPSULE, LIQUID FILLED ORAL at 09:29

## 2021-12-18 RX ADMIN — ACETAMINOPHEN 650 MG: 325 TABLET ORAL at 23:42

## 2021-12-18 RX ADMIN — IBUPROFEN 600 MG: 600 TABLET, FILM COATED ORAL at 19:07

## 2021-12-18 NOTE — H&P
Maternal Admission H&P  [facility]  Date of Admission: 2021  Date of Service: 2021    Name      Yesenia Pickett         1995  MRN       [id number]  PCP        Celina Sandoval at [facility], [phone].    ________________________________________________________________________    Assessment and Plan:  26 year old  at 37w0d. Active labor. SROM just occurred. 7-8 cm and feeling pushy.    Baby AGA. Anticipate .    Group B strep: Negative    Its a girl!    Flu and tdap uptodate    Needs COVID booster    Breast- has pump    Declines anything for pain control  ________________________________________________________________________    Chief Complaint: active labor management.    HPI: Yesenia Pickett is a 26 year old woman at 37w0d, based on an Estimated Date of Delivery: 2022. She presents with contractions since 10 PM. She was 2cm in clinic 2 days ago. On arrival she was 4-5cm. Very uncomfortable per RN staff. No LOF or bloody show. Reported good fetal movement.     Patient Active Problem List    Diagnosis Date Noted     Encounter for triage in pregnant patient 2021     Priority: Medium     Labor abnormal 2021     Priority: Medium     Thrombocytopenia (H) 2021     Priority: Medium     Anemia, unspecified type 2021     Priority: Medium     Family history of congenital heart disease in sister 2021     Priority: Medium     Seasonal allergies 2021     Priority: Medium     Encounter for supervision of other normal pregnancy in second trimester 10/20/2015     Priority: Medium     Constipation 2015     Priority: Medium      OB History    Para Term  AB Living   2 1 1 0 0 1   SAB IAB Ectopic Multiple Live Births   0 0 0 0 1      # Outcome Date GA Lbr Kavon/2nd Weight Sex Delivery Anes PTL Lv   2 Current            1 Term 10/21/15 39w6d 03:43 / 01:22 3.402 kg (7 lb 8 oz) F  Vag-Spont Local N DADA      Name: Shanti      Apgar1: 8  Apgar5: 9     Review of Systems Negative except what is noted in HPI.   Past Medical History:   Diagnosis Date     Female infertility      Infertility counseling 10/22/2020      No past surgical history on file.Patient has no known allergies.  Family History   Problem Relation Age of Onset     Heart Defect Sister      No Known Problems Father      No Known Problems Brother      No Known Problems Brother      No Known Problems Sister      No Known Problems Sister      Social History     Socioeconomic History     Marital status: Single     Spouse name: Not on file     Number of children: Not on file     Years of education: Not on file     Highest education level: Not on file   Occupational History     Not on file   Tobacco Use     Smoking status: Never Smoker     Smokeless tobacco: Never Used   Substance and Sexual Activity     Alcohol use: Not on file     Drug use: Not on file     Sexual activity: Not on file   Other Topics Concern     Not on file   Social History Narrative     Not on file     Social Determinants of Health     Financial Resource Strain: Not on file   Food Insecurity: Not on file   Transportation Needs: Not on file   Physical Activity: Not on file   Stress: Not on file   Social Connections: Not on file   Intimate Partner Violence: Not on file   Housing Stability: Not on file     Prior to Admission Medication List  Medications Prior to Admission   Medication Sig Dispense Refill Last Dose     docusate sodium (COLACE) 100 MG capsule Take 1 capsule (100 mg) by mouth 2 times daily as needed for constipation 60 capsule 0      ferrous sulfate (FE TABS) 325 (65 Fe) MG EC tablet Take 1 tablet (325 mg) by mouth daily 90 tablet 1      polyethylene glycol (MIRALAX) 17 g packet Take 17 g by mouth daily 20 each 0      prenatal vit no.130-iron-folic (PRENATAL VITAMIN) 27 mg iron- 800 mcg Tab tablet [PRENATAL VIT NO.130-IRON-FOLIC (PRENATAL VITAMIN) 27 MG IRON-  800 MCG TAB TABLET] Take 1 tablet by mouth once daily. 30 tablet 11       Allergies  No Known Allergies  Immunization History   Administered Date(s) Administered     Flu, Unspecified 01/05/2012, 09/22/2015, 09/15/2016     HEPA 04/04/2012, 12/13/2012     HPV 04/04/2012, 06/15/2012, 12/13/2012     HPV Quadrivalent 04/04/2012, 06/15/2012, 12/13/2012, 06/14/2013     Hep B, Peds or Adolescent 01/05/2012, 04/04/2012     HepA-Adult 06/14/2013     HepA-Peds, Unspecified 04/04/2012     HepA-ped 2 Dose 12/13/2012     HepB 08/23/2011, 01/05/2012, 04/04/2012     HepB, Unspecified 08/23/2011, 01/05/2012, 04/04/2012     Influenza (IIV3) PF 01/05/2012, 06/15/2012     Influenza Vaccine IM > 6 months Valent IIV4 (Alfuria,Fluzone) 09/26/2014, 10/22/2020, 10/20/2021     Influenza Vaccine IM Ages 6-35 Months 4 Valent (PF) 09/26/2014     Influenza Vaccine, 6+MO IM (QUADRIVALENT W/PRESERVATIVES) 09/22/2015, 09/15/2016     MMR 08/23/2011, 01/05/2012     Meningococcal (Menactra ) 04/04/2012     Meningococcal (Menomune ) 04/04/2012     Rubella Immunity: Titer/md Dx 06/25/2021     TD (ADULT, 7+) 01/05/2012, 12/13/2012, 05/16/2013     Td (Adult), Adsorbed 12/13/2012, 07/03/2013     Tdap (Adacel,Boostrix) 08/23/2011, 08/18/2015, 10/20/2021     Tdap (Adult) Unspecified Formulation 01/05/2012, 07/03/2013     Varicella 04/04/2012, 06/15/2012      Physical Exam  Patient Vitals for the past 24 hrs:   BP Pulse Resp   12/18/21 0319 131/63 96 18     Wt Readings from Last 1 Encounters:   12/15/21 73 kg (161 lb)   Prepregnancy: 51.3 kg (113 lb), BMI 19.39. Total gain: 21.8 kg (48 lb) (expected gain: 11.5 kg (25 lb)-16 kg (35 lb)).    HEART: no resp distress  LUNGS: acyanotic  Fetal Heart Tones: Baseline 150 bpm, variability moderate (6-25 bpm), no decelerations. Reactive.  CONTRACTIONS:  regular, every 2-3 minutes.  CERVIX: dilation 7-8 cm , 100% effaced, soft, and 0 station.  FLUID: Clear.  Fetal Presentation: vertex.  Labs    Group B Strep PCR   Date  Value Ref Range Status   12/15/2021 Negative Negative Final     Comment:     Presumed negative for Streptococcus agalactiae (Group B Streptococcus) or the number of organisms may be below the limit of detection of the assay.      Antibody Screen   Date Value Ref Range Status   12/18/2021 Negative Negative Final     Hepatitis B Surface Antigen   Date Value Ref Range Status   06/24/2021 Negative Negative Final     Neisseria gonorrhoeae   Date Value Ref Range Status   05/18/2021 Negative Negative Final     Hemoglobin   Date Value Ref Range Status   12/18/2021 12.1 11.7 - 15.7 g/dL Final      Admission on 12/18/2021   Component Date Value Ref Range Status     SARS CoV2 PCR 12/18/2021 Positive* Negative Final    POSITIVE: SARS-CoV-2 (COVID-19) RNA detected, presumed positive.     WBC Count 12/18/2021 13.4* 4.0 - 11.0 10e3/uL Final     RBC Count 12/18/2021 3.90  3.80 - 5.20 10e6/uL Final     Hemoglobin 12/18/2021 12.1  11.7 - 15.7 g/dL Final     Hematocrit 12/18/2021 35.8  35.0 - 47.0 % Final     MCV 12/18/2021 92  78 - 100 fL Final     MCH 12/18/2021 31.0  26.5 - 33.0 pg Final     MCHC 12/18/2021 33.8  31.5 - 36.5 g/dL Final     RDW 12/18/2021 12.3  10.0 - 15.0 % Final     Platelet Count 12/18/2021 137* 150 - 450 10e3/uL Final     % Neutrophils 12/18/2021 75  % Final     % Lymphocytes 12/18/2021 15  % Final     % Monocytes 12/18/2021 8  % Final     % Eosinophils 12/18/2021 1  % Final     % Basophils 12/18/2021 0  % Final     % Immature Granulocytes 12/18/2021 1  % Final     NRBCs per 100 WBC 12/18/2021 0  <1 /100 Final     Absolute Neutrophils 12/18/2021 10.2* 1.6 - 8.3 10e3/uL Final     Absolute Lymphocytes 12/18/2021 2.1  0.8 - 5.3 10e3/uL Final     Absolute Monocytes 12/18/2021 1.1  0.0 - 1.3 10e3/uL Final     Absolute Eosinophils 12/18/2021 0.1  0.0 - 0.7 10e3/uL Final     Absolute Basophils 12/18/2021 0.0  0.0 - 0.2 10e3/uL Final     Absolute Immature Granulocytes 12/18/2021 0.1  <=0.4 10e3/uL Final     Absolute  NRBCs 12/18/2021 0.0  10e3/uL Final     ABO/RH(D) 12/18/2021 B POS   Final     Antibody Screen 12/18/2021 Negative  Negative Final     SPECIMEN EXPIRATION DATE 12/18/2021 [id number]   Final     Hold Specimen 12/18/2021 [facility]   Final     Hold Specimen 12/18/2021 [facility]   Final        Completed by:   Kyleigh Lazaro DO  [facility]  12/18/2021 4:04 AM   used: Not needed.

## 2021-12-18 NOTE — PROGRESS NOTES
0145, Sve 6cm/90/-1. Pt getting very uncomfortable. Discussed soaking in the tub and patient okay with plan.   0147, Pt assisted into the tub

## 2021-12-18 NOTE — PROGRESS NOTES
0500 Pt assisted into the bathroom to void. Writer making patient beds and turned to grab lienens and pt had  Near syncopal episode and was caught by . Pt assisted bask to bed. Vital signs within normal limits.

## 2021-12-18 NOTE — PLAN OF CARE
Problem: Delayed Labor Progression (Labor)  Goal: Effective Progression to Delivery  Outcome: Improving     Problem: Labor Pain (Labor)  Goal: Acceptable Pain Control  Outcome: Improving     Problem: Change in Fetal Wellbeing (Labor)  Goal: Stable Fetal Wellbeing  Outcome: Improving   Pt is doing well coping with labor pain. Changing positions and soaking in the tub.  very supportive.

## 2021-12-18 NOTE — PROGRESS NOTES
Yesenia Pickett presented self to [facility] with c/o ctxs  EFM applied.  FHT category: category 1.  Contraction pattern observed: 4egular, palpate strong  LMP 04/01/2021 (Within Months)   Tests performed:Covid PCR    Dr Lazaro notified of pt arrival, labs, sve, pt request for pain mdication, orders received.     Daisy Hooper RN

## 2021-12-18 NOTE — PLAN OF CARE
Problem: Adjustment to Role Transition (Postpartum Vaginal Delivery)  Goal: Successful Maternal Role Transition  Outcome: Improving  Intervention: Support Maternal Role Transition  Recent Flowsheet Documentation  Taken 12/18/2021 0533 by Daisy Hooper RN  Supportive Measures:   active listening utilized   self-care encouraged  Parent/Child Attachment Promotion:   caring behavior modeled   cue recognition promoted   face-to-face positioning promoted   positive reinforcement provided     Problem: Bleeding (Postpartum Vaginal Delivery)  Goal: Hemostasis  Outcome: Improving  Intervention: Monitor and Manage Postpartum Bleeding  Recent Flowsheet Documentation  Taken 12/18/2021 0533 by Daisy Hooper RN  Syncope Management:   head lowered   verbally stimulated   tactile stimulated  Taken 12/18/2021 0333 by Daisy Hooper RN  Stabilization Measures: respiratory support increased   Pt voiding without any problems. Fundus firm and at umbilicus. Lochia light with no clots. Managing pain with ibuprofen. Vital signs normal.

## 2021-12-18 NOTE — L&D DELIVERY NOTE
Delivery Summary    Yesenia Pickett MRN# [id number]   Age: 26 year old YOB: 1995     ASSESSMENT & PLAN:    Patient came in in active labor 4-5cm on arrival. She declined anything for pain management. She progressed quite quickly and SROM occurred. she was 7-8cm and feeling pushy. She quickly progressed to anterior lip which was reduced. During that check I did question whether baby was OP. Patient began moving side to side with her labor contractions. She brought the baby down to  within a few contractions. She delivered a viable female infant in the DOP position. Anterior shoulder delivered with gentle downward traction, posterior shoulder delivered with gentle upward traction followed by body and limbs. Infant was handed to mom. Delayed cord clamping performed and cord cut by FOB. 10 U IM Pitocin was given for active management of the third stage and placenta delivered spontaneously and intact with 3 vessel cord. She was noted to have a 1st degree perineal and small lower left labial lacerations which were repaired with 3-0 vicryl using local anesthesia. FHTs mostly category 1 except minimal variability with variables decels with pushing/close to delivery.        Gregg Pickett-Yesenia [[id number]]    Labor Event Times    Labor onset date: 21 Onset time: 10:30 PM      Labor Events     labor?: No  Labor Type: Spontaneous  Predominate monitoring during 1st stage: continuous electronic fetal monitoring     Antibiotics received during labor?: No     Rupture identifier: Sac 1  Rupture date/time: 21 0240   Rupture type: Spontaneous rupture of membranes occuring during spontaneous labor or augmentation  Fluid color: Clear  Fluid odor: Normal     Augmentation: None  1:1 continuous labor support provided by?: RN, provider       Delivery/Placenta Date and Time    Delivery Date: 21 Delivery Time:  3:07 AM   Placenta Date/Time: 2021  3:12 AM  Oxytocin given at the time of delivery:  "after delivery of placenta  Delivering clinician: Kyleigh Lazaro DO   Other personnel present at delivery:  Provider Role   Daisy Hooper, RN Registered Nurse   Piedad Rivers RN Registered Nurse         Vaginal Counts     Initial count performed by 2 team members:  Two Team Members   Dr Tejas Hooper       Needles Suture Needles Sponges (RETIRED) Instruments   Initial counts   5    Added to count 1 1     Relief counts       Final counts 1 1 5          Placed during labor Accounted for at the end of labor   FSE No NA   IUPC No NA   Cervadil No NA              Final count performed by 2 team members:  Two Team Members   Jose Alberto Lazaro      Final count correct?: Yes     Apgars    Living status: Living   1 Minute 5 Minute 10 Minute 15 Minute 20 Minute   Skin color: 1  1       Heart rate: 2  2       Reflex irritability: 2  2       Muscle tone: 2  2       Respiratory effort: 2  2       Total: 9  9       Apgars assigned by: PIEDAD RIVERS     Cord    Vessels: 3 Vessels    Cord Complications: None               Cord Blood Disposition: Discard    Gases Sent?: No    Delayed cord clamping?: Yes    Cord Clamping Delay (seconds):  seconds    Stem cell collection?: No       [place] Resuscitation    Methods: None      Measurements    Weight: 6 lb 9 oz Length: 1' 7\"   Head circumference: 32.4 cm       Skin to Skin and Feeding Plan    Skin to skin initiation date/time: 1841    Skin to skin with: Mother  Skin to skin end date/time:     Breastfeeding initiated date/time: 2021 0345     Labor Events and Shoulder Dystocia    Fetal Tracing Prior to Delivery: Category 1, Category 2  Fetal Tracing Comments: variables with minimal variability just prior to delivery   Shoulder dystocia present?: Neg     Delivery (Maternal) (Provider to Complete) (138568)    Episiotomy: None  Perineal lacerations: 1st Repaired?: Yes   Labial laceration: left Repaired?: Yes   Repair suture: 3-0 Vicryl  Genital " tract inspection done: Pos     Blood Loss  Mother: Yesenia Pickett #[id number]   Start of Mother's Information    Delivery Blood Loss  12/17/21 2230 - 12/18/21 0413    Delivery QBL (mL) Hospital Encounter 442 mL    Total  442 mL         End of Mother's Information  Mother: Yesenia Pickett #[id number]          Delivery - Provider to Complete (286790)    Delivering clinician: Kyleigh Lazaro DO  Attempted Delivery Types (Choose all that apply): Spontaneous Vaginal Delivery  Delivery Type (Choose the 1 that will go to the Birth History): Vaginal, Spontaneous                   Other personnel:  Provider Role   Daisy Hooper RN Registered Nurse   Jackie Rivers RN Registered Nurse                Placenta    Date/Time: 12/18/2021  3:12 AM  Removal: Spontaneous  Disposition: Hospital disposal           Anesthesia    Method: Local                Presentation and Position    Presentation: Vertex    Position: Middle Occiput Posterior                 Kyleigh Lazaro DO

## 2021-12-19 VITALS
HEART RATE: 84 BPM | SYSTOLIC BLOOD PRESSURE: 117 MMHG | OXYGEN SATURATION: 98 % | DIASTOLIC BLOOD PRESSURE: 72 MMHG | TEMPERATURE: 97.8 F | RESPIRATION RATE: 16 BRPM

## 2021-12-19 LAB — T PALLIDUM AB SER QL: NONREACTIVE

## 2021-12-19 PROCEDURE — 99207 PR NO CHARGE LOS: CPT | Performed by: FAMILY MEDICINE

## 2021-12-19 PROCEDURE — 250N000013 HC RX MED GY IP 250 OP 250 PS 637: Performed by: FAMILY MEDICINE

## 2021-12-19 RX ORDER — IBUPROFEN 800 MG/1
800 TABLET, FILM COATED ORAL EVERY 6 HOURS PRN
Qty: 60 TABLET | Refills: 0 | Status: SHIPPED | OUTPATIENT
Start: 2021-12-19 | End: 2022-02-11

## 2021-12-19 RX ADMIN — DOCUSATE SODIUM 100 MG: 100 CAPSULE, LIQUID FILLED ORAL at 08:21

## 2021-12-19 NOTE — DISCHARGE SUMMARY
Maternal Discharge Summary  [facility]  Date of Service: 2021    Name      Yesenia Pickett         1995  MRN       [id number]  PCP        Celina Doan at [facility], [phone].    ________________________________________________________________________    Assessment:  Yesenai Pickett is a 26 year old now  s/p vaginal, spontaneous  at 37w0d on 21.  Doing well    Discharge Plan:   1. Discharge to Home. Condition at Discharge:  stable  2. Physical activity: Regular.  3. Diet:  Regular.  4. Home care nurse: ordered for 1-2 days from now.  5. Lactation clinic appointment: ordered.  6. Contraception plan: undecided, will follow up at postpartum visit.  7. Unable to get COVID vaccine is Covid positive  8. Follow up with Dr. Kyleigh Lazaro in 2 weeks and 6 weeks for routine postpartum care.  Future Appointments   Date Time Provider Department Center   2021  1:00 PM Kyliegh Lazaro DO [facility]   2021  1:40 PM [facility]   2022 10:40 AM Kyleigh Lazaro DO [facility]      ________________________________________________________________________    Admission Date:  2021  Discharge Date:  2021  Delivery Date:  21  Gestational Age at Delivery: 37w0d    Principal Diagnosis: Labor and delivery  Delivery type: Vaginal, Spontaneous     Active Problems:     (normal spontaneous vaginal delivery)    Lab test positive for detection of COVID-19 virus      Subjective:  Patient denies headache, dizziness, dyspnea, and leg pain. Ambulating and eating.  Voiding spontaneous.  Lochia is mild.  Pain is well controlled.  She remains asymptomatic from COVID symptoms.    Discharge Exam:  Patient Vitals for the past 24 hrs:   BP Temp Temp src Pulse Resp SpO2   21 0807 117/72 97.8  F (36.6  C) Oral 84 16 98 %   21 2329 113/70 97.6  F (36.4  C) Oral 74 18 98 %   21 1700  109/56 98.4  F (36.9  C) Oral 98 18 --   12/18/21 1300 116/60 98.2  F (36.8  C) Oral 90 18 --     General - alert, comfortable  Heart - RRR, no murmurs  Lungs - CTA bilaterally  Abdomen - fundus firm, nontender, below umbilicus  Extremities - trace edema  : Perineum is healing well.  Still just a little bit of swelling.  No hematomas noted    Admission on 12/18/2021   Component Date Value Ref Range Status     SARS CoV2 PCR 12/18/2021 Positive* Negative Final    POSITIVE: SARS-CoV-2 (COVID-19) RNA detected, presumed positive.     Treponema Antibody Total 12/18/2021 Nonreactive  Nonreactive Final     WBC Count 12/18/2021 13.4* 4.0 - 11.0 10e3/uL Final     RBC Count 12/18/2021 3.90  3.80 - 5.20 10e6/uL Final     Hemoglobin 12/18/2021 12.1  11.7 - 15.7 g/dL Final     Hematocrit 12/18/2021 35.8  35.0 - 47.0 % Final     MCV 12/18/2021 92  78 - 100 fL Final     MCH 12/18/2021 31.0  26.5 - 33.0 pg Final     MCHC 12/18/2021 33.8  31.5 - 36.5 g/dL Final     RDW 12/18/2021 12.3  10.0 - 15.0 % Final     Platelet Count 12/18/2021 137* 150 - 450 10e3/uL Final     % Neutrophils 12/18/2021 75  % Final     % Lymphocytes 12/18/2021 15  % Final     % Monocytes 12/18/2021 8  % Final     % Eosinophils 12/18/2021 1  % Final     % Basophils 12/18/2021 0  % Final     % Immature Granulocytes 12/18/2021 1  % Final     NRBCs per 100 WBC 12/18/2021 0  <1 /100 Final     Absolute Neutrophils 12/18/2021 10.2* 1.6 - 8.3 10e3/uL Final     Absolute Lymphocytes 12/18/2021 2.1  0.8 - 5.3 10e3/uL Final     Absolute Monocytes 12/18/2021 1.1  0.0 - 1.3 10e3/uL Final     Absolute Eosinophils 12/18/2021 0.1  0.0 - 0.7 10e3/uL Final     Absolute Basophils 12/18/2021 0.0  0.0 - 0.2 10e3/uL Final     Absolute Immature Granulocytes 12/18/2021 0.1  <=0.4 10e3/uL Final     Absolute NRBCs 12/18/2021 0.0  10e3/uL Final     ABO/RH(D) 12/18/2021 B POS   Final     Antibody Screen 12/18/2021 Negative  Negative Final     SPECIMEN EXPIRATION DATE 12/18/2021  [id number]   Final     Hold Specimen 12/18/2021 [facility]   Final     Hold Specimen 12/18/2021 [facility]   Final      Discharge Medications:   See medication reconciliation.     Completed by:   DO JAIME Zuniga [facility]  12/19/2021 12:35 PM   used: Not needed.

## 2021-12-19 NOTE — PLAN OF CARE
Problem: Pain (Postpartum Vaginal Delivery)  Goal: Acceptable Pain Control  Outcome: Improving     VSS, up independently in room, voiding well. Denies pain. Independent with  cares. Reviewed discharge instructions, questions answered. Patient declined interpretor for discharge. Patient to discharge home with baby.

## 2021-12-19 NOTE — PLAN OF CARE
Problem: Pain (Postpartum Vaginal Delivery)  Goal: Acceptable Pain Control  Outcome: No Change   Vital signs stable.  Up independently.  Very quiet.  Denies pain.  Active in infant cares.  Continues in isolation for covid.  Continue with plan of care.  Notify care team of any issues/concerns.

## 2021-12-19 NOTE — DISCHARGE INSTRUCTIONS
Postpartum Vaginal Delivery Instructions    Activity       Ask family and friends for help when you need it.    Do not place anything in your vagina for 6 weeks.    You are not restricted on other activities, but take it easy for a few weeks to allow your body to recover from delivery.  You are able to do any activities you feel up to that point.    No driving until you have stopped taking your pain medications (usually two weeks after delivery).     Call your health care provider if you have any of these symptoms:       Increased pain, swelling, redness, or fluid around your stiches from an episiotomy or perineal tear.    A fever above 100.4 F (38 C) with or without chills when placing a thermometer under your tongue.    You soak a sanitary pad with blood within 1 hour, or you see blood clots larger than a golf ball.    Bleeding that lasts more than 6 weeks.    Vaginal discharge that smells bad.    Severe pain, cramping or tenderness in your lower belly area.    A need to urinate more frequently (use the toilet more often), more urgently (use the toilet very quickly), or it burns when you urinate.    Nausea and vomiting.    Redness, swelling or pain around a vein in your leg.    Problems breastfeeding or a red or painful area on your breast.    Chest pain and cough or are gasping for air.    Problems coping with sadness, anxiety, or depression.  If you have any concerns about hurting yourself or the baby, call your provider immediately.     You have questions or concerns after you return home.     Keep your hands clean:  Always wash your hands before touching your perineal area and stitches.  This helps reduce your risk of infection.  If your hands aren't dirty, you may use an alcohol hand-rub to clean your hands. Keep your nails clean and short.        Warning Signs after Having a Baby (Postpartum)  Keep this paper on your fridge or somewhere else where you can see it.  Baby's birth date ___________________ My  "provider/hospital ___________________________________  The symptoms below can happen to anyone after giving birth. They can be very serious. Call your provider if you have any of these warning signs.   Call 911 if you:    Are thinking about hurting yourself or your baby    Have any signs below in bold and with a star*  Bleeding  Losing too much blood is called a hemorrhage. These are signs of hemorrhage:    Soaking through a pad in less than an hour    Passing blood clots bigger than a golf ball  Mood problems (postpartum depression)  Many women feel sad after having a baby. But for others, mood swings are worse. Call your provider right away if you are:    Feeling so anxious or nervous that you can't care for yourself or your baby    Thinking about hurting yourself or your baby*  High blood pressure (pre-eclampsia)  Even if you didn't have high blood pressure when you were pregnant, you are still at risk for the high blood pressure disease called pre-eclampsia. Your risk can last up to 12 weeks after giving birth.  These are signs of pre-eclampsia:    Severe headache that does not get better after taking medicine*    Seeing spots, flashes of lights, blurry vision or other changes to eyesight*    Pain on your right side under your rib cage    Sudden swelling in the hands and face    Fainting, shaking or other signs of a seizure*    General feeling of \"not feeling right\"*  Infection    Redness around your incision (if you had surgery)    Really bad pain in your bottom if you had stitches    Any yellow, white or green fluid coming from places where you had stitches or surgery    Fever of 100.4 F (38 C) or higher  Blood clots  After you give birth, your body naturally clumps, or clots, its blood to help prevent blood loss (hemorrhage). Sometimes, this can lead to dangerous blood clots. Here are signs that you may have a blood clot:    Pain in your chest*    Hard to breathe*    A red or swollen spot on your leg that is " warm or painful when you touch it  Remember:You know your body. If something doesn't feel right, call your provider.  For informational purposes only. Not to replace the advice of your health care provider. Copyright   2020 [employer]. All rights reserved. Clinically reviewed by LAMBERTO Cisneros-OB, MSN. ZMP 977374 - 1/20.  For informational purposes only. Not to replace the advice of your health care provider. Copyright   2020 [employer]. All rights reserved. Clinically reviewed by LAMBERTO Cisneros-OB, MSN. ZMP 230850 - 1/20.

## 2021-12-20 ENCOUNTER — MEDICAL CORRESPONDENCE (OUTPATIENT)
Dept: HEALTH INFORMATION MANAGEMENT | Facility: CLINIC | Age: 26
End: 2021-12-20
Payer: COMMERCIAL

## 2022-01-18 VITALS
WEIGHT: 111 LBS | TEMPERATURE: 98.3 F | HEART RATE: 99 BPM | DIASTOLIC BLOOD PRESSURE: 80 MMHG | RESPIRATION RATE: 12 BRPM | BODY MASS INDEX: 18.91 KG/M2 | SYSTOLIC BLOOD PRESSURE: 120 MMHG

## 2022-01-18 VITALS
RESPIRATION RATE: 16 BRPM | DIASTOLIC BLOOD PRESSURE: 62 MMHG | SYSTOLIC BLOOD PRESSURE: 114 MMHG | WEIGHT: 113.25 LBS | BODY MASS INDEX: 19.33 KG/M2 | HEIGHT: 64 IN | HEART RATE: 80 BPM

## 2022-01-18 VITALS
WEIGHT: 109 LBS | HEART RATE: 106 BPM | TEMPERATURE: 98.6 F | RESPIRATION RATE: 14 BRPM | BODY MASS INDEX: 18.56 KG/M2 | SYSTOLIC BLOOD PRESSURE: 113 MMHG | DIASTOLIC BLOOD PRESSURE: 73 MMHG

## 2022-02-11 ENCOUNTER — PRENATAL OFFICE VISIT (OUTPATIENT)
Dept: FAMILY MEDICINE | Facility: CLINIC | Age: 27
End: 2022-02-11
Payer: COMMERCIAL

## 2022-02-11 ENCOUNTER — MEDICAL CORRESPONDENCE (OUTPATIENT)
Dept: HEALTH INFORMATION MANAGEMENT | Facility: CLINIC | Age: 27
End: 2022-02-11

## 2022-02-11 VITALS
RESPIRATION RATE: 20 BRPM | WEIGHT: 138 LBS | BODY MASS INDEX: 23.69 KG/M2 | SYSTOLIC BLOOD PRESSURE: 130 MMHG | HEART RATE: 80 BPM | DIASTOLIC BLOOD PRESSURE: 87 MMHG

## 2022-02-11 DIAGNOSIS — L29.9 ITCHY SCALP: ICD-10-CM

## 2022-02-11 DIAGNOSIS — D69.6 THROMBOCYTOPENIA (H): ICD-10-CM

## 2022-02-11 LAB
ERYTHROCYTE [DISTWIDTH] IN BLOOD BY AUTOMATED COUNT: 11.7 % (ref 10–15)
HCT VFR BLD AUTO: 38.9 % (ref 35–47)
HGB BLD-MCNC: 13.2 G/DL (ref 11.7–15.7)
MCH RBC QN AUTO: 29.5 PG (ref 26.5–33)
MCHC RBC AUTO-ENTMCNC: 33.9 G/DL (ref 31.5–36.5)
MCV RBC AUTO: 87 FL (ref 78–100)
PLATELET # BLD AUTO: 174 10E3/UL (ref 150–450)
RBC # BLD AUTO: 4.47 10E6/UL (ref 3.8–5.2)
WBC # BLD AUTO: 6.2 10E3/UL (ref 4–11)

## 2022-02-11 PROCEDURE — 91301 PR COVID VAC MODERNA 100 MCG/0.5 ML IM: CPT | Performed by: FAMILY MEDICINE

## 2022-02-11 PROCEDURE — 85027 COMPLETE CBC AUTOMATED: CPT | Performed by: FAMILY MEDICINE

## 2022-02-11 PROCEDURE — 36415 COLL VENOUS BLD VENIPUNCTURE: CPT | Performed by: FAMILY MEDICINE

## 2022-02-11 PROCEDURE — 99207 PR POST PARTUM EXAM: CPT | Performed by: FAMILY MEDICINE

## 2022-02-11 PROCEDURE — 0011A PR COVID VAC MODERNA 100 MCG/0.5 ML IM: CPT | Performed by: FAMILY MEDICINE

## 2022-02-11 RX ORDER — KETOCONAZOLE 20 MG/ML
SHAMPOO TOPICAL DAILY PRN
Qty: 120 ML | Refills: 0 | Status: SHIPPED | OUTPATIENT
Start: 2022-02-11 | End: 2024-03-21

## 2022-02-11 NOTE — PROGRESS NOTES
Post Partum Check:  Delivery at 37w0d now .  Date of delivery: 21    Patient concerns: itchy scalp. using Head and shoulders doesn't help.     Bleeding: no concerns. stopped    Pain: no concerns    LMP:Patient's last menstrual period was 2021 (within months).     Depression: no concerns  Postpartum Depression Screen:0  Item 10 (suicidal thoughts): Negative.  Interpretation Guide: Possible Depression = 10 or greater.     Contraception Plan:  none    Immunizations needed:  COVID    Pap smear:  uptodate    Physical Exam:  Blood pressure 130/87, pulse 80, resp. rate 20, weight 62.6 kg (138 lb), last menstrual period 2021, currently breastfeeding. Body mass index is 23.69 kg/m .  Heart: Regular rate and rhythm, no murmurs, rubs, or gallops.  Lungs: Clear to auscultation bilaterally.  No crackles.  Abdomen: Soft, nontender, bowel sounds normal.  No significant uterine tenderness.  Genitourinary:  Vulva, vagina, and cervix are normal in appearance.  Uterus is 8 weeks in size.  No adnexal fullness.  No excessive tenderness. she had a little TTP at the introitus. Provided reassurance this is where her tear was. Healing fine. Might be just a little bit sensitive still.   Extremities: Nontender, no significant edema.  Scalp: a little dry and flaky- not terrible. She has shaved her head about 1 cm growth of hair. She states she showered today and scraped a lot of the flakes off already.       Assessment and Plan     27 year old  here today for postpartum check.  1. Discussed ideal body weight.   2. Pap smear up to date.  3. Contraception Plan: none.  4. Postpartum Evaluation of Pregnancy/Chronic Health Conditions: thrombocytopenia. Recheck today. .  5. Referrals: none.  6. Next physical exam due in one year.      Yesenia was seen today for post partum exam.    Diagnoses and all orders for this visit:    Routine postpartum follow-up    Thrombocytopenia (H): recheck to see if gestational   -     CBC  with platelets; Future    Itchy scalp: try ketoconazole next. Or T-Gel shampooo OTC  -     ketoconazole (NIZORAL) 2 % external shampoo; Apply topically daily as needed for itching or irritation    Other orders  -     TX COVID VAC MODERNA 100 MCG/0.5 ML IM  -     MODERNA COVID-19 VACCINE 2ND DOSE APPT; Future

## 2022-03-09 ENCOUNTER — MEDICAL CORRESPONDENCE (OUTPATIENT)
Dept: HEALTH INFORMATION MANAGEMENT | Facility: CLINIC | Age: 27
End: 2022-03-09
Payer: COMMERCIAL

## 2022-03-11 ENCOUNTER — ALLIED HEALTH/NURSE VISIT (OUTPATIENT)
Dept: FAMILY MEDICINE | Facility: CLINIC | Age: 27
End: 2022-03-11
Attending: FAMILY MEDICINE
Payer: COMMERCIAL

## 2022-03-11 DIAGNOSIS — Z23 COVID-19 VACCINE ADMINISTERED: Primary | ICD-10-CM

## 2022-03-11 PROCEDURE — 99207 PR NO CHARGE NURSE ONLY: CPT

## 2022-03-11 PROCEDURE — 91301 PR COVID VAC MODERNA 100 MCG/0.5 ML IM: CPT

## 2022-03-11 PROCEDURE — 0012A PR COVID VAC MODERNA 100 MCG/0.5 ML IM: CPT

## 2022-05-02 ENCOUNTER — MEDICAL CORRESPONDENCE (OUTPATIENT)
Dept: HEALTH INFORMATION MANAGEMENT | Facility: CLINIC | Age: 27
End: 2022-05-02
Payer: COMMERCIAL

## 2022-06-15 ENCOUNTER — ONCOLOGY VISIT (OUTPATIENT)
Dept: ONCOLOGY | Facility: HOSPITAL | Age: 27
End: 2022-06-15
Attending: INTERNAL MEDICINE
Payer: COMMERCIAL

## 2022-06-15 ENCOUNTER — LAB (OUTPATIENT)
Dept: INFUSION THERAPY | Facility: HOSPITAL | Age: 27
End: 2022-06-15
Attending: INTERNAL MEDICINE
Payer: COMMERCIAL

## 2022-06-15 VITALS
BODY MASS INDEX: 21.18 KG/M2 | WEIGHT: 123.4 LBS | SYSTOLIC BLOOD PRESSURE: 128 MMHG | DIASTOLIC BLOOD PRESSURE: 82 MMHG | OXYGEN SATURATION: 98 % | TEMPERATURE: 98.1 F | RESPIRATION RATE: 16 BRPM | HEART RATE: 86 BPM

## 2022-06-15 DIAGNOSIS — D69.6 THROMBOCYTOPENIA (H): Primary | ICD-10-CM

## 2022-06-15 DIAGNOSIS — D69.6 THROMBOCYTOPENIA (H): ICD-10-CM

## 2022-06-15 LAB
ALBUMIN SERPL-MCNC: 4.6 G/DL (ref 3.5–5)
ALP SERPL-CCNC: 54 U/L (ref 45–120)
ALT SERPL W P-5'-P-CCNC: 10 U/L (ref 0–45)
ANION GAP SERPL CALCULATED.3IONS-SCNC: 7 MMOL/L (ref 5–18)
AST SERPL W P-5'-P-CCNC: 14 U/L (ref 0–40)
BASOPHILS # BLD AUTO: 0 10E3/UL (ref 0–0.2)
BASOPHILS NFR BLD AUTO: 0 %
BILIRUB SERPL-MCNC: 0.6 MG/DL (ref 0–1)
BUN SERPL-MCNC: 7 MG/DL (ref 8–22)
CALCIUM SERPL-MCNC: 9.3 MG/DL (ref 8.5–10.5)
CHLORIDE BLD-SCNC: 104 MMOL/L (ref 98–107)
CO2 SERPL-SCNC: 26 MMOL/L (ref 22–31)
CREAT SERPL-MCNC: 0.78 MG/DL (ref 0.6–1.1)
EOSINOPHIL # BLD AUTO: 0.1 10E3/UL (ref 0–0.7)
EOSINOPHIL NFR BLD AUTO: 2 %
ERYTHROCYTE [DISTWIDTH] IN BLOOD BY AUTOMATED COUNT: 12.5 % (ref 10–15)
GFR SERPL CREATININE-BSD FRML MDRD: >90 ML/MIN/1.73M2
GLUCOSE BLD-MCNC: 93 MG/DL (ref 70–125)
HCT VFR BLD AUTO: 39.2 % (ref 35–47)
HGB BLD-MCNC: 12.5 G/DL (ref 11.7–15.7)
IMM GRANULOCYTES # BLD: 0 10E3/UL
IMM GRANULOCYTES NFR BLD: 0 %
LYMPHOCYTES # BLD AUTO: 2.1 10E3/UL (ref 0.8–5.3)
LYMPHOCYTES NFR BLD AUTO: 47 %
MCH RBC QN AUTO: 27.9 PG (ref 26.5–33)
MCHC RBC AUTO-ENTMCNC: 31.9 G/DL (ref 31.5–36.5)
MCV RBC AUTO: 88 FL (ref 78–100)
MONOCYTES # BLD AUTO: 0.3 10E3/UL (ref 0–1.3)
MONOCYTES NFR BLD AUTO: 7 %
NEUTROPHILS # BLD AUTO: 2 10E3/UL (ref 1.6–8.3)
NEUTROPHILS NFR BLD AUTO: 44 %
NRBC # BLD AUTO: 0 10E3/UL
NRBC BLD AUTO-RTO: 0 /100
PLATELET # BLD AUTO: 133 10E3/UL (ref 150–450)
POTASSIUM BLD-SCNC: 3.6 MMOL/L (ref 3.5–5)
PROT SERPL-MCNC: 7.9 G/DL (ref 6–8)
RBC # BLD AUTO: 4.48 10E6/UL (ref 3.8–5.2)
SODIUM SERPL-SCNC: 137 MMOL/L (ref 136–145)
WBC # BLD AUTO: 4.5 10E3/UL (ref 4–11)

## 2022-06-15 PROCEDURE — 80053 COMPREHEN METABOLIC PANEL: CPT

## 2022-06-15 PROCEDURE — 99213 OFFICE O/P EST LOW 20 MIN: CPT | Performed by: NURSE PRACTITIONER

## 2022-06-15 PROCEDURE — 36415 COLL VENOUS BLD VENIPUNCTURE: CPT

## 2022-06-15 PROCEDURE — 85025 COMPLETE CBC W/AUTO DIFF WBC: CPT

## 2022-06-15 PROCEDURE — G0463 HOSPITAL OUTPT CLINIC VISIT: HCPCS

## 2022-06-15 ASSESSMENT — PAIN SCALES - GENERAL: PAINLEVEL: NO PAIN (0)

## 2022-06-15 NOTE — PROGRESS NOTES
"Oncology Rooming Note    Julia 15, 2022 1:40 PM   Yesenia Pickett is a 27 year old female who presents for:    Chief Complaint   Patient presents with     Hematology     Initial Vitals: /82   Pulse 86   Temp 98.1  F (36.7  C) (Oral)   Resp 16   Wt 56 kg (123 lb 6.4 oz)   SpO2 98%   BMI 21.18 kg/m   Estimated body mass index is 21.18 kg/m  as calculated from the following:    Height as of 6/24/21: 1.626 m (5' 4\").    Weight as of this encounter: 56 kg (123 lb 6.4 oz). Body surface area is 1.59 meters squared.  No Pain (0) Comment: Data Unavailable   No LMP recorded.  Allergies reviewed: Yes  Medications reviewed: Yes    Medications: Medication refills not needed today.  Pharmacy name entered into [facility]: [id number] - [place], [place] - [address]    Clinical concerns: Yesenia is here for follow up and feeling well. Information verified using Interpretive services, [facility] #[id number]      Anh Holcomb RN              "

## 2022-06-15 NOTE — LETTER
"    6/15/2022         RE: Yesenia Pickett  [address]  [place] [address]        Dear Colleague,    Thank you for referring your patient, Yesenia Pickett, to the [facility] [place]. Please see a copy of my visit note below.    Oncology Rooming Note    Julia 15, 2022 1:40 PM   Yesenia Pickett is a 27 year old female who presents for:    Chief Complaint   Patient presents with     Hematology     Initial Vitals: /82   Pulse 86   Temp 98.1  F (36.7  C) (Oral)   Resp 16   Wt 56 kg (123 lb 6.4 oz)   SpO2 98%   BMI 21.18 kg/m   Estimated body mass index is 21.18 kg/m  as calculated from the following:    Height as of 6/24/21: 1.626 m (5' 4\").    Weight as of this encounter: 56 kg (123 lb 6.4 oz). Body surface area is 1.59 meters squared.  No Pain (0) Comment: Data Unavailable   No LMP recorded.  Allergies reviewed: Yes  Medications reviewed: Yes    Medications: Medication refills not needed today.  Pharmacy name entered into [employer]: [facility][id number] - [place], [place] - [address]    Clinical concerns: Yesenia is here for follow up and feeling well. Information verified using Interpretive services, [facility] #[id number]      Anh Holcomb RN                [facility] Hematology and Oncology Progress Note    Patient: Yesenia Pickett  MRN: [id number]  Date of Service: 06/15/2022        Reason for Visit    Chief Complaint   Patient presents with     Hematology       Assessment and Plan    Cancer Staging  No matching staging information was found for the patient.    1.  Anemia, likely iron deficiency and from pregnancy: Patient was put on iron supplement while she was pregnant.  Her hemoglobin did improve.  She had her baby in December 2021.  She is been off iron for a couple of months at this point.  Her hemoglobin is normal.  No need for any further evaluation.  Did tell her that she may need iron supplement if she gets pregnant again in the future.  Continue to monitor labs per primary care " doctor or OB/GYN.    2.  Thrombocytopenia: This has been very mild and an ongoing issue for a couple of years.  It does intermittently get into the normal range.  Today it is very slightly low.  He could have some low-grade ITP but overall is very mild and stable.  No complications.  This can be continued to be watched as well especially with pregnancy.  No need for further evaluation in our clinic at this time.      ECOG Performance    0 - Independent    Distress Screening (within last 30 days)    No data recorded     Pain  Pain Score: No Pain (0)    Problem List    Patient Active Problem List   Diagnosis     Constipation     Family history of congenital heart disease in sister     Seasonal allergies     Thrombocytopenia (H)     Anemia, unspecified type      (normal spontaneous vaginal delivery)     Lab test positive for detection of COVID-19 virus        ______________________________________________________________________________    History of Present Illness    Patient is a pleasant 27-year-old woman who saw Dr. Reeder in 2021 for anemia and thrombocytopenia.  She was pregnant at that time.  Her hemoglobin was 10 point.3, 10.7.  Her platelet count was around 121 and 140.  Apparently back in  she also had some low platelets but she did have an infection at that time.  Patient was felt to have some pregnancy iron deficiency anemia so she was put on an oral iron supplement.  She did take that through her pregnancy.  She had a healthy baby in December at 37 weeks.  She has been off the iron for a couple of months at this point.  She says she is feeling fine.  No bleeding or bruising issues.  No fatigue or shortness of breath or dyspnea on exertion.  Denies any infections recently.    Review of Systems    Pertinent items are noted in HPI.    Past History    Past Medical History:   Diagnosis Date     Female infertility      Infertility counseling 10/22/2020       PHYSICAL EXAM  /82   Pulse 86    Temp 98.1  F (36.7  C) (Oral)   Resp 16   Wt 56 kg (123 lb 6.4 oz)   SpO2 98%   BMI 21.18 kg/m      GENERAL: no acute distress. Cooperative in conversation. Here alone. Mask on.   on telephone.  RESP: Regular respiratory rate. No expiratory wheezes   MUSCULOSKELETAL: no bilateral leg swelling  NEURO: non focal. Alert and oriented x3.   PSYCH: within normal limits. No depression or anxiety.  SKIN: exposed skin is dry intact.     Lab Results    Recent Results (from the past 168 hour(s))   Comprehensive metabolic panel   Result Value Ref Range    Sodium 137 136 - 145 mmol/L    Potassium 3.6 3.5 - 5.0 mmol/L    Chloride 104 98 - 107 mmol/L    Carbon Dioxide (CO2) 26 22 - 31 mmol/L    Anion Gap 7 5 - 18 mmol/L    Urea Nitrogen 7 (L) 8 - 22 mg/dL    Creatinine 0.78 0.60 - 1.10 mg/dL    Calcium 9.3 8.5 - 10.5 mg/dL    Glucose 93 70 - 125 mg/dL    Alkaline Phosphatase 54 45 - 120 U/L    AST 14 0 - 40 U/L    ALT 10 0 - 45 U/L    Protein Total 7.9 6.0 - 8.0 g/dL    Albumin 4.6 3.5 - 5.0 g/dL    Bilirubin Total 0.6 0.0 - 1.0 mg/dL    GFR Estimate >90 >60 mL/min/1.73m2   CBC with platelets and differential   Result Value Ref Range    WBC Count 4.5 4.0 - 11.0 10e3/uL    RBC Count 4.48 3.80 - 5.20 10e6/uL    Hemoglobin 12.5 11.7 - 15.7 g/dL    Hematocrit 39.2 35.0 - 47.0 %    MCV 88 78 - 100 fL    MCH 27.9 26.5 - 33.0 pg    MCHC 31.9 31.5 - 36.5 g/dL    RDW 12.5 10.0 - 15.0 %    Platelet Count 133 (L) 150 - 450 10e3/uL    % Neutrophils 44 %    % Lymphocytes 47 %    % Monocytes 7 %    % Eosinophils 2 %    % Basophils 0 %    % Immature Granulocytes 0 %    NRBCs per 100 WBC 0 <1 /100    Absolute Neutrophils 2.0 1.6 - 8.3 10e3/uL    Absolute Lymphocytes 2.1 0.8 - 5.3 10e3/uL    Absolute Monocytes 0.3 0.0 - 1.3 10e3/uL    Absolute Eosinophils 0.1 0.0 - 0.7 10e3/uL    Absolute Basophils 0.0 0.0 - 0.2 10e3/uL    Absolute Immature Granulocytes 0.0 <=0.4 10e3/uL    Absolute NRBCs 0.0 10e3/uL       Imaging    No results  found.   on phone and was involved in entire conversation      Signed by: RODRIGUE Mills CNP      Again, thank you for allowing me to participate in the care of your patient.        Sincerely,        RODRIGUE Mills CNP

## 2022-06-15 NOTE — PROGRESS NOTES
[facility] Hematology and Oncology Progress Note    Patient: Yesenia Pickett  MRN: [id number]  Date of Service: 06/15/2022        Reason for Visit    Chief Complaint   Patient presents with     Hematology       Assessment and Plan    Cancer Staging  No matching staging information was found for the patient.    1.  Anemia, likely iron deficiency and from pregnancy: Patient was put on iron supplement while she was pregnant.  Her hemoglobin did improve.  She had her baby in 2021.  She is been off iron for a couple of months at this point.  Her hemoglobin is normal.  No need for any further evaluation.  Did tell her that she may need iron supplement if she gets pregnant again in the future.  Continue to monitor labs per primary care doctor or OB/GYN.    2.  Thrombocytopenia: This has been very mild and an ongoing issue for a couple of years.  It does intermittently get into the normal range.  Today it is very slightly low.  He could have some low-grade ITP but overall is very mild and stable.  No complications.  This can be continued to be watched as well especially with pregnancy.  No need for further evaluation in our clinic at this time.      ECOG Performance    0 - Independent    Distress Screening (within last 30 days)    No data recorded     Pain  Pain Score: No Pain (0)    Problem List    Patient Active Problem List   Diagnosis     Constipation     Family history of congenital heart disease in sister     Seasonal allergies     Thrombocytopenia (H)     Anemia, unspecified type      (normal spontaneous vaginal delivery)     Lab test positive for detection of COVID-19 virus        ______________________________________________________________________________    History of Present Illness    Patient is a pleasant 27-year-old woman who saw Dr. Reeder in 2021 for anemia and thrombocytopenia.  She was pregnant at that time.  Her hemoglobin was 10 point.3, 10.7.  Her platelet count was around 121  and 140.  Apparently back in 2018 she also had some low platelets but she did have an infection at that time.  Patient was felt to have some pregnancy iron deficiency anemia so she was put on an oral iron supplement.  She did take that through her pregnancy.  She had a healthy baby in December at 37 weeks.  She has been off the iron for a couple of months at this point.  She says she is feeling fine.  No bleeding or bruising issues.  No fatigue or shortness of breath or dyspnea on exertion.  Denies any infections recently.    Review of Systems    Pertinent items are noted in HPI.    Past History    Past Medical History:   Diagnosis Date     Female infertility      Infertility counseling 10/22/2020       PHYSICAL EXAM  /82   Pulse 86   Temp 98.1  F (36.7  C) (Oral)   Resp 16   Wt 56 kg (123 lb 6.4 oz)   SpO2 98%   BMI 21.18 kg/m      GENERAL: no acute distress. Cooperative in conversation. Here alone. Mask on.   on telephone.  RESP: Regular respiratory rate. No expiratory wheezes   MUSCULOSKELETAL: no bilateral leg swelling  NEURO: non focal. Alert and oriented x3.   PSYCH: within normal limits. No depression or anxiety.  SKIN: exposed skin is dry intact.     Lab Results    Recent Results (from the past 168 hour(s))   Comprehensive metabolic panel   Result Value Ref Range    Sodium 137 136 - 145 mmol/L    Potassium 3.6 3.5 - 5.0 mmol/L    Chloride 104 98 - 107 mmol/L    Carbon Dioxide (CO2) 26 22 - 31 mmol/L    Anion Gap 7 5 - 18 mmol/L    Urea Nitrogen 7 (L) 8 - 22 mg/dL    Creatinine 0.78 0.60 - 1.10 mg/dL    Calcium 9.3 8.5 - 10.5 mg/dL    Glucose 93 70 - 125 mg/dL    Alkaline Phosphatase 54 45 - 120 U/L    AST 14 0 - 40 U/L    ALT 10 0 - 45 U/L    Protein Total 7.9 6.0 - 8.0 g/dL    Albumin 4.6 3.5 - 5.0 g/dL    Bilirubin Total 0.6 0.0 - 1.0 mg/dL    GFR Estimate >90 >60 mL/min/1.73m2   CBC with platelets and differential   Result Value Ref Range    WBC Count 4.5 4.0 - 11.0 10e3/uL    RBC  Count 4.48 3.80 - 5.20 10e6/uL    Hemoglobin 12.5 11.7 - 15.7 g/dL    Hematocrit 39.2 35.0 - 47.0 %    MCV 88 78 - 100 fL    MCH 27.9 26.5 - 33.0 pg    MCHC 31.9 31.5 - 36.5 g/dL    RDW 12.5 10.0 - 15.0 %    Platelet Count 133 (L) 150 - 450 10e3/uL    % Neutrophils 44 %    % Lymphocytes 47 %    % Monocytes 7 %    % Eosinophils 2 %    % Basophils 0 %    % Immature Granulocytes 0 %    NRBCs per 100 WBC 0 <1 /100    Absolute Neutrophils 2.0 1.6 - 8.3 10e3/uL    Absolute Lymphocytes 2.1 0.8 - 5.3 10e3/uL    Absolute Monocytes 0.3 0.0 - 1.3 10e3/uL    Absolute Eosinophils 0.1 0.0 - 0.7 10e3/uL    Absolute Basophils 0.0 0.0 - 0.2 10e3/uL    Absolute Immature Granulocytes 0.0 <=0.4 10e3/uL    Absolute NRBCs 0.0 10e3/uL       Imaging    No results found.   on phone and was involved in entire conversation      Signed by: RODRIGUE Mills CNP

## 2022-09-03 ENCOUNTER — HEALTH MAINTENANCE LETTER (OUTPATIENT)
Age: 27
End: 2022-09-03

## 2023-01-15 ENCOUNTER — HEALTH MAINTENANCE LETTER (OUTPATIENT)
Age: 28
End: 2023-01-15

## 2023-07-21 ENCOUNTER — OFFICE VISIT (OUTPATIENT)
Dept: FAMILY MEDICINE | Facility: CLINIC | Age: 28
End: 2023-07-21
Payer: COMMERCIAL

## 2023-07-21 VITALS
SYSTOLIC BLOOD PRESSURE: 125 MMHG | OXYGEN SATURATION: 100 % | TEMPERATURE: 97.8 F | HEART RATE: 76 BPM | BODY MASS INDEX: 19.84 KG/M2 | WEIGHT: 115.6 LBS | DIASTOLIC BLOOD PRESSURE: 79 MMHG | RESPIRATION RATE: 14 BRPM

## 2023-07-21 DIAGNOSIS — H10.31 ACUTE BACTERIAL CONJUNCTIVITIS OF RIGHT EYE: Primary | ICD-10-CM

## 2023-07-21 PROCEDURE — 99213 OFFICE O/P EST LOW 20 MIN: CPT | Performed by: STUDENT IN AN ORGANIZED HEALTH CARE EDUCATION/TRAINING PROGRAM

## 2023-07-21 RX ORDER — POLYMYXIN B SULFATE AND TRIMETHOPRIM 1; 10000 MG/ML; [USP'U]/ML
SOLUTION OPHTHALMIC
Qty: 10 ML | Refills: 0 | Status: SHIPPED | OUTPATIENT
Start: 2023-07-21 | End: 2023-07-28

## 2023-07-21 NOTE — PROGRESS NOTES
Assessment & Plan     Acute bacterial conjunctivitis of right eye  Isolated right eye bacterial conjunctivitis and discharge, recommended topical antibiotic solutions every 3 hours for 5 to 7 days until symptoms resolve.  Follow-up if symptoms do not improve.  - trimethoprim-polymyxin b (POLYTRIM) 91003-5.1 UNIT/ML-% ophthalmic solution; 1 drop in affected eye(s) every 3 hrs while awake for 5-7 days until resolved - max 6 doses per day    Return if symptoms worsen or fail to improve.    Lucien Caldwell MD  [facility]    Olu Patterson is a 28 year old, presenting for the following health issues:  Eye Problem (Rt eye swelling and pain x 5 day. Itchy.) and Conjunctivitis        2/11/2022     3:23 PM   Additional Questions   Roomed by Blanquita   Accompanied by self     HPI     Patient presents with isolated right eye irritation, redness, itchiness, discharge for the past 5 days.    She denies any blurriness, vision loss, floaters.  She denies any fevers, chills, cough, sore throat, other skin rash or preceding illnesses.      Review of Systems   Constitutional, HEENT, cardiovascular, pulmonary, gi and gu systems are negative, except as otherwise noted.      Objective    /79   Pulse 76   Temp 97.8  F (36.6  C) (Oral)   Resp 14   Wt 52.4 kg (115 lb 9.6 oz)   LMP 07/03/2023 (Approximate)   SpO2 100%   BMI 19.84 kg/m    Body mass index is 19.84 kg/m .  Physical Exam   GENERAL: healthy, alert and no distress  EYES: PERRL, EOMI, and conjunctiva/corneas- conjunctival injection right eye with clear discharge  MS: no gross musculoskeletal defects noted, no edema  SKIN: no suspicious lesions or rashes  NEURO: Normal strength and tone, mentation intact and speech normal  PSYCH: mentation appears normal, affect normal/bright

## 2023-11-14 ENCOUNTER — ALLIED HEALTH/NURSE VISIT (OUTPATIENT)
Dept: FAMILY MEDICINE | Facility: CLINIC | Age: 28
End: 2023-11-14
Payer: COMMERCIAL

## 2023-11-14 DIAGNOSIS — Z23 ENCOUNTER FOR IMMUNIZATION: Primary | ICD-10-CM

## 2023-11-14 PROCEDURE — 99207 PR NO CHARGE NURSE ONLY: CPT

## 2023-11-14 PROCEDURE — 90686 IIV4 VACC NO PRSV 0.5 ML IM: CPT

## 2023-11-14 PROCEDURE — 90471 IMMUNIZATION ADMIN: CPT

## 2023-11-14 NOTE — PROGRESS NOTES
Prior to immunization administration, verified patients identity using patient s name and date of birth. Please see Immunization Activity for additional information.     Screening Questionnaire for Adult Immunization    Are you sick today?   No   Do you have allergies to medications, food, a vaccine component or latex?   No   Have you ever had a serious reaction after receiving a vaccination?   No   Do you have a long-term health problem with heart, lung, kidney, or metabolic disease (e.g., diabetes), asthma, a blood disorder, no spleen, complement component deficiency, a cochlear implant, or a spinal fluid leak?  Are you on long-term aspirin therapy?   No   Do you have cancer, leukemia, HIV/AIDS, or any other immune system problem?   No   Do you have a parent, brother, or sister with an immune system problem?   No   In the past 3 months, have you taken medications that affect  your immune system, such as prednisone, other steroids, or anticancer drugs; drugs for the treatment of rheumatoid arthritis, Crohn s disease, or psoriasis; or have you had radiation treatments?   No   Have you had a seizure, or a brain or other nervous system problem?   No   During the past year, have you received a transfusion of blood or blood    products, or been given immune (gamma) globulin or antiviral drug?   No   For women: Are you pregnant or is there a chance you could become       pregnant during the next month?   No   Have you received any vaccinations in the past 4 weeks?   No     Immunization questionnaire answers were all negative.    I have reviewed the following standing orders:   This patient is due and qualifies for the Influenza vaccine.    Click here for Influenza Vaccine Standing Order    I have reviewed the vaccines inclusion and exclusion criteria; No concerns regarding eligibility.     Patient instructed to remain in clinic for 15 minutes afterwards, and to report any adverse reactions.     Screening performed by Isiah  FABIO Cantu on 11/14/2023 at 11:49 AM.

## 2024-02-17 ENCOUNTER — HEALTH MAINTENANCE LETTER (OUTPATIENT)
Age: 29
End: 2024-02-17

## 2024-03-20 LAB
ABO/RH(D): NORMAL
ANTIBODY SCREEN: NEGATIVE
SPECIMEN EXPIRATION DATE: NORMAL

## 2024-03-21 ENCOUNTER — PRENATAL OFFICE VISIT (OUTPATIENT)
Dept: FAMILY MEDICINE | Facility: CLINIC | Age: 29
End: 2024-03-21
Payer: COMMERCIAL

## 2024-03-21 ENCOUNTER — TELEPHONE (OUTPATIENT)
Dept: FAMILY MEDICINE | Facility: CLINIC | Age: 29
End: 2024-03-21

## 2024-03-21 VITALS
WEIGHT: 112 LBS | OXYGEN SATURATION: 100 % | DIASTOLIC BLOOD PRESSURE: 74 MMHG | RESPIRATION RATE: 18 BRPM | SYSTOLIC BLOOD PRESSURE: 106 MMHG | HEIGHT: 64 IN | HEART RATE: 83 BPM | TEMPERATURE: 97.8 F | BODY MASS INDEX: 19.12 KG/M2

## 2024-03-21 DIAGNOSIS — N92.6 MISSED MENSES: Primary | ICD-10-CM

## 2024-03-21 DIAGNOSIS — Z34.81 ENCOUNTER FOR SUPERVISION OF OTHER NORMAL PREGNANCY IN FIRST TRIMESTER: ICD-10-CM

## 2024-03-21 DIAGNOSIS — Z3A.01 LESS THAN 8 WEEKS GESTATION OF PREGNANCY: ICD-10-CM

## 2024-03-21 DIAGNOSIS — R11.0 NAUSEA: ICD-10-CM

## 2024-03-21 PROBLEM — Z23 ENCOUNTER FOR IMMUNIZATION: Status: RESOLVED | Noted: 2020-10-22 | Resolved: 2024-03-21

## 2024-03-21 PROBLEM — U07.1 LAB TEST POSITIVE FOR DETECTION OF COVID-19 VIRUS: Status: RESOLVED | Noted: 2021-12-18 | Resolved: 2024-03-21

## 2024-03-21 LAB
ALBUMIN UR-MCNC: NEGATIVE MG/DL
ERYTHROCYTE [DISTWIDTH] IN BLOOD BY AUTOMATED COUNT: 14.9 % (ref 10–15)
GLUCOSE UR STRIP-MCNC: NEGATIVE MG/DL
HBA1C MFR BLD: 5.9 % (ref 0–5.6)
HCG UR QL: POSITIVE
HCT VFR BLD AUTO: 32.3 % (ref 35–47)
HGB BLD-MCNC: 10 G/DL (ref 11.7–15.7)
KETONES UR STRIP-MCNC: NEGATIVE MG/DL
MCH RBC QN AUTO: 23.8 PG (ref 26.5–33)
MCHC RBC AUTO-ENTMCNC: 31 G/DL (ref 31.5–36.5)
MCV RBC AUTO: 77 FL (ref 78–100)
PLATELET # BLD AUTO: 123 10E3/UL (ref 150–450)
RBC # BLD AUTO: 4.2 10E6/UL (ref 3.8–5.2)
RUBV IGG SERPL QL IA: >33 INDEX
RUBV IGG SERPL QL IA: POSITIVE
T PALLIDUM AB SER QL: NONREACTIVE
WBC # BLD AUTO: 5.1 10E3/UL (ref 4–11)

## 2024-03-21 PROCEDURE — 87591 N.GONORRHOEAE DNA AMP PROB: CPT | Performed by: PHYSICIAN ASSISTANT

## 2024-03-21 PROCEDURE — 87389 HIV-1 AG W/HIV-1&-2 AB AG IA: CPT | Performed by: PHYSICIAN ASSISTANT

## 2024-03-21 PROCEDURE — 86850 RBC ANTIBODY SCREEN: CPT | Performed by: PHYSICIAN ASSISTANT

## 2024-03-21 PROCEDURE — 87340 HEPATITIS B SURFACE AG IA: CPT | Performed by: PHYSICIAN ASSISTANT

## 2024-03-21 PROCEDURE — 83036 HEMOGLOBIN GLYCOSYLATED A1C: CPT | Performed by: PHYSICIAN ASSISTANT

## 2024-03-21 PROCEDURE — 83655 ASSAY OF LEAD: CPT | Mod: 90 | Performed by: PHYSICIAN ASSISTANT

## 2024-03-21 PROCEDURE — 85027 COMPLETE CBC AUTOMATED: CPT | Performed by: PHYSICIAN ASSISTANT

## 2024-03-21 PROCEDURE — 99214 OFFICE O/P EST MOD 30 MIN: CPT | Performed by: PHYSICIAN ASSISTANT

## 2024-03-21 PROCEDURE — 86803 HEPATITIS C AB TEST: CPT | Performed by: PHYSICIAN ASSISTANT

## 2024-03-21 PROCEDURE — 86762 RUBELLA ANTIBODY: CPT | Performed by: PHYSICIAN ASSISTANT

## 2024-03-21 PROCEDURE — 87491 CHLMYD TRACH DNA AMP PROBE: CPT | Performed by: PHYSICIAN ASSISTANT

## 2024-03-21 PROCEDURE — 81025 URINE PREGNANCY TEST: CPT | Performed by: PHYSICIAN ASSISTANT

## 2024-03-21 PROCEDURE — 86901 BLOOD TYPING SEROLOGIC RH(D): CPT | Performed by: PHYSICIAN ASSISTANT

## 2024-03-21 PROCEDURE — 36415 COLL VENOUS BLD VENIPUNCTURE: CPT | Performed by: PHYSICIAN ASSISTANT

## 2024-03-21 PROCEDURE — 86780 TREPONEMA PALLIDUM: CPT | Performed by: PHYSICIAN ASSISTANT

## 2024-03-21 PROCEDURE — 86900 BLOOD TYPING SEROLOGIC ABO: CPT | Performed by: PHYSICIAN ASSISTANT

## 2024-03-21 PROCEDURE — 99000 SPECIMEN HANDLING OFFICE-LAB: CPT | Performed by: PHYSICIAN ASSISTANT

## 2024-03-21 PROCEDURE — 81003 URINALYSIS AUTO W/O SCOPE: CPT | Performed by: PHYSICIAN ASSISTANT

## 2024-03-21 PROCEDURE — 87086 URINE CULTURE/COLONY COUNT: CPT | Performed by: PHYSICIAN ASSISTANT

## 2024-03-21 RX ORDER — PNV NO.95/FERROUS FUM/FOLIC AC 28MG-0.8MG
1 TABLET ORAL DAILY
Qty: 30 CAPSULE | Refills: 12 | Status: SHIPPED | OUTPATIENT
Start: 2024-03-21

## 2024-03-21 RX ORDER — PYRIDOXINE HCL (VITAMIN B6) 25 MG
25 TABLET ORAL EVERY 8 HOURS PRN
Qty: 90 TABLET | Refills: 2 | Status: SHIPPED | OUTPATIENT
Start: 2024-03-21 | End: 2024-08-21

## 2024-03-21 NOTE — TELEPHONE ENCOUNTER
Pt pharmacy called and stated that the Prenatal MV-Min-Fe Fum-FA-DHA (PRENATAL MULTIVITAMIN PLUS DHA) 27-0.8-250 MG CAPS isn't covered by her insurance. Please send new alternative RX.  Thanks!

## 2024-03-21 NOTE — PROGRESS NOTES
"ASSESSMENT and PLAN:  Pregnancy Intake Appointment  Yesenia Pickett is 29 year old and .  Patient's last menstrual period was 2024 (approximate).  Estimated Date of Delivery: Oct 31, 2024  She will be seeing Dr. Zhang for OB care at [facility].  Screening pregnancy lab work was drawn.  Prenatal vitamin prescribed.  Problem list and current medications reviewed and updated.  She declines Covid vaccine.  Dating ultrasound ordered.  Potential exposures/risks discussed: work environment, raw meat/seafood/deli meat, home construction, cats, caffeine.  Follow up in 4 weeks. Appointment with Dr. Zhang 24.    2. Nausea  Nausea with poor appetite.  - pyridOXINE (VITAMIN B6) 25 MG tablet; Take 1 tablet (25 mg) by mouth every 8 hours as needed (for nausea)  Dispense: 90 tablet; Refill: 2    3. Encounter for supervision of other normal pregnancy in first trimester  -- History of thrombocytopenia and anemia. Saw hematology during previous pregnancy. Will check CBC today.      HPI:  Yesenia Pickett is a 29 year old female here for pregnancy intake.  She is .  Patient's last menstrual period was 2024 (approximate).  Planned pregnancy.  Reports nausea and poor appetite--\"can't even stand the smell food\".  She reports her sister was born with a hole in her heart--surgically repaired, niece has \"poor vision\"--patient unsure if this is related to birth defect, she will get more information from her sister.  History of thrombocytopenia and anemia during previous pregnancy.    Past Medical History:   Diagnosis Date    Anemia     Bleeding disorder (H24)     Female infertility     Infertility counseling 10/22/2020     (normal spontaneous vaginal delivery) 2021        No past surgical history on file.     Current Outpatient Medications   Medication    Prenatal MV-Min-Fe Fum-FA-DHA (PRENATAL MULTIVITAMIN PLUS DHA) 27-0.8-250 MG CAPS    pyridOXINE (VITAMIN B6) 25 MG tablet     No current " "facility-administered medications for this visit.          OBJECTIVE:  No Known Allergies  /74 (BP Location: Left arm, Patient Position: Sitting, Cuff Size: Adult Small)   Pulse 83   Temp 97.8  F (36.6  C) (Temporal)   Resp 18   Ht 1.63 m (5' 4.17\")   Wt 50.8 kg (112 lb)   LMP 01/25/2024 (Approximate)   SpO2 100%   BMI 19.12 kg/m     Body mass index is 19.12 kg/m .     Vital signs stable as recorded above.  General: Patient is alert and oriented x 3, in no apparent distress    Prenatal Office Visit on 03/21/2024   Component Date Value Ref Range Status    hCG Urine Qualitative 03/21/2024 Positive (A)  Negative Final          Other screening pregnancy lab work is pending.      Please see OB Episode for complete details.    This dictation uses voice recognition software, which may contain typographical errors.  "

## 2024-03-22 ENCOUNTER — HOSPITAL ENCOUNTER (OUTPATIENT)
Dept: ULTRASOUND IMAGING | Facility: HOSPITAL | Age: 29
Discharge: HOME | End: 2024-03-22
Attending: PHYSICIAN ASSISTANT | Admitting: PHYSICIAN ASSISTANT
Payer: COMMERCIAL

## 2024-03-22 DIAGNOSIS — Z3A.01 LESS THAN 8 WEEKS GESTATION OF PREGNANCY: ICD-10-CM

## 2024-03-22 LAB
BACTERIA UR CULT: NO GROWTH
C TRACH DNA SPEC QL PROBE+SIG AMP: NEGATIVE
HBV SURFACE AG SERPL QL IA: NONREACTIVE
HCV AB SERPL QL IA: NONREACTIVE
HIV 1+2 AB+HIV1 P24 AG SERPL QL IA: NONREACTIVE
N GONORRHOEA DNA SPEC QL NAA+PROBE: NEGATIVE

## 2024-03-22 PROCEDURE — 76801 OB US < 14 WKS SINGLE FETUS: CPT

## 2024-03-23 LAB — LEAD BLDV-MCNC: <2 UG/DL

## 2024-03-26 ENCOUNTER — TELEPHONE (OUTPATIENT)
Dept: FAMILY MEDICINE | Facility: CLINIC | Age: 29
End: 2024-03-26
Payer: COMMERCIAL

## 2024-03-26 DIAGNOSIS — D64.9 ANEMIA, UNSPECIFIED TYPE: Primary | ICD-10-CM

## 2024-03-26 DIAGNOSIS — Z3A.01 LESS THAN 8 WEEKS GESTATION OF PREGNANCY: ICD-10-CM

## 2024-03-26 DIAGNOSIS — K59.00 CONSTIPATION, UNSPECIFIED CONSTIPATION TYPE: Primary | ICD-10-CM

## 2024-03-26 RX ORDER — FERROUS GLUCONATE 324(38)MG
324 TABLET ORAL
Qty: 90 TABLET | Refills: 3 | Status: SHIPPED | OUTPATIENT
Start: 2024-03-26

## 2024-03-26 NOTE — TELEPHONE ENCOUNTER
Pt state she has not had a bowel movement in over a week but feeling okay. She is wondering if she start taking iron pills will it make it worse and miralax is not helping with constipation.

## 2024-03-26 NOTE — TELEPHONE ENCOUNTER
----- Message from Guera Pacheco PA-C sent at 3/26/2024  4:44 PM CDT -----  Her hemoglobin is a little bit low.  It would be good to take an iron pill once a day if she can, to prevent this from going lower.  I sent an iron pill to her pharmacy.  Sometimes this can cause nausea or constipation.  If she has any trouble with the medicine, stop it and then follow-up with her OB doctor at her next visit.

## 2024-03-28 NOTE — TELEPHONE ENCOUNTER
If she is already having constipation, I would say do not start iron pill right now.  We can wait until later.    Can you please also review her common treatment options for constipation during pregnancy?  (Increased fluids, increased fiber, prune juice)  She can take MiraLAX once a day every day until constipation is better.  If that is not helping, I could also send her prescription for Colace stool softener.  That is okay to take when you are pregnant.

## 2024-03-29 RX ORDER — POLYETHYLENE GLYCOL 3350 17 G/17G
1 POWDER, FOR SOLUTION ORAL DAILY
Qty: 225 G | Refills: 1 | Status: SHIPPED | OUTPATIENT
Start: 2024-03-29 | End: 2024-04-16

## 2024-03-29 RX ORDER — DOCUSATE SODIUM 100 MG/1
CAPSULE, LIQUID FILLED ORAL
Qty: 35 CAPSULE | Refills: 0 | Status: SHIPPED | OUTPATIENT
Start: 2024-03-29 | End: 2024-04-16

## 2024-03-29 NOTE — TELEPHONE ENCOUNTER
Contacted patient and relayed message below.  Patient requesting Rx for MiraLAX AND colace    Message sent to Guera Garcia RN  [facility]    Guera ORDONEZ  If she is already having constipation, I would say do not start iron pill right now.  We can wait until later.    Can you please also review her common treatment options for constipation during pregnancy?  (Increased fluids, increased fiber, prune juice)  She can take MiraLAX once a day every day until constipation is better.  If that is not helping, I could also send her prescription for Colace stool softener.  That is okay to take when you are pregnant.

## 2024-04-16 ENCOUNTER — PRENATAL OFFICE VISIT (OUTPATIENT)
Dept: FAMILY MEDICINE | Facility: CLINIC | Age: 29
End: 2024-04-16
Payer: COMMERCIAL

## 2024-04-16 VITALS
HEIGHT: 64 IN | DIASTOLIC BLOOD PRESSURE: 70 MMHG | RESPIRATION RATE: 16 BRPM | SYSTOLIC BLOOD PRESSURE: 110 MMHG | WEIGHT: 110 LBS | BODY MASS INDEX: 18.78 KG/M2 | TEMPERATURE: 97.4 F | HEART RATE: 107 BPM | OXYGEN SATURATION: 99 %

## 2024-04-16 DIAGNOSIS — Z34.90 ENCOUNTER FOR SUPERVISION OF NORMAL PREGNANCY, ANTEPARTUM, UNSPECIFIED GRAVIDITY: Primary | ICD-10-CM

## 2024-04-16 DIAGNOSIS — D64.9 ANEMIA, UNSPECIFIED TYPE: ICD-10-CM

## 2024-04-16 DIAGNOSIS — D69.6 THROMBOCYTOPENIA (H): ICD-10-CM

## 2024-04-16 DIAGNOSIS — K59.00 CONSTIPATION, UNSPECIFIED CONSTIPATION TYPE: ICD-10-CM

## 2024-04-16 PROCEDURE — 81003 URINALYSIS AUTO W/O SCOPE: CPT | Performed by: FAMILY MEDICINE

## 2024-04-16 PROCEDURE — 99207 PR PRENATAL VISIT: CPT | Performed by: FAMILY MEDICINE

## 2024-04-16 RX ORDER — POLYETHYLENE GLYCOL 3350 17 G/17G
1 POWDER, FOR SOLUTION ORAL DAILY
Qty: 225 G | Refills: 1 | Status: SHIPPED | OUTPATIENT
Start: 2024-04-16 | End: 2024-08-21

## 2024-04-16 RX ORDER — PRENATAL VIT/IRON FUM/FOLIC AC 27MG-0.8MG
1 TABLET ORAL
COMMUNITY
Start: 2024-03-22 | End: 2024-04-16

## 2024-04-16 ASSESSMENT — PAIN SCALES - GENERAL: PAINLEVEL: NO PAIN (0)

## 2024-04-16 NOTE — PROGRESS NOTES
First OB Appointment    Assessment & Plan:  Dating: Final EDC is Oct 26, 2024.   Aspirin: Based on her risk factors, Yesenia is NOT at high risk of preeclampsia. Low-dose aspirin prophylaxis is NOT recommended for prevention of preeclampsia.   Weight: Based on prepregnancy Could not be calculated, recommended weight gain of Could not be calculated.  Diabetes risk: Based on her risk factors, Yesenia is at increased risk of DM2/GDM, due to prediabetes .   Trisomy screening: declines after discussion of options of testing.   Carrier screening for SMA and CF: declines  Fatigue and anemia present. Taking iron supplementation. Consider vitamin and TSH in the future, she declines today.   Will follow cbc for platelets.   Follow up in four weeks.     Order Summary    ICD-10-CM    1. Encounter for supervision of normal pregnancy, antepartum, unspecified   Z34.90       2. Constipation, unspecified constipation type  K59.00 Urinalysis, OB Screen     polyethylene glycol (MIRALAX) 17 GM/Dose powder      3. Thrombocytopenia (H24)  D69.6       4. Anemia, unspecified type  D64.9          Future Appointments   Date Time Provider Department Center   2024 12:40 PM Adriana Zhang MD [facility]   2024 12:40 PM Adriana Zhang MD [facility]     Subjective:  Current pregnancy symptoms include: none except a little more tired than last pregnancies.     Preeclampsia risk factors - at increased risk if 1+ high risk or 2+ moderate risk factors  High risk factors (1+):NONE  Moderate risk factors (2+): NONE     Diabetes risk factors - at increased risk if BMI 25+ and 1+ additional risk factors    Prepregnancy Could not be calculated   Additional risk factors (1+): Prediabetes    I reviewed the following components of the patient chart today:  OB intake note  Active problems  Past Medical History  Medications  Allergies  Family History  Social History  Genetic Questionairre  Prenatal Labs  Prenatal Ultrasound  OB  "history  OB History    Para Term  AB Living   3 2 2 0 0 2   SAB IAB Ectopic Multiple Live Births   0 0 0 0 2      # Outcome Date GA Lbr Kavon/2nd Weight Sex Type Anes PTL Lv   3 Current            2 Term 21 37w0d 04:15 / 00:22 2.977 kg (6 lb 9 oz) F Vag-Spont Local N DADA      Name: KEVEN,FEMALE-MAYI      Apgar1: 9  Apgar5: 9   1 Term 10/21/15 39w6d 03:43 / 01:22 3.402 kg (7 lb 8 oz) F Vag-Spont Local N DADA      Name: Shanti      Apgar1: 8  Apgar5: 9       Objective:  /70 (BP Location: Left arm, Patient Position: Sitting, Cuff Size: Adult Small)   Pulse 107   Temp 97.4  F (36.3  C) (Temporal)   Resp 16   Ht 1.626 m (5' 4\")   Wt 49.9 kg (110 lb)   LMP 2024 (Approximate)   SpO2 99%   BMI 18.88 kg/m   Body mass index is 18.88 kg/m .   See prenatal flowsheet and physical exam portion of prenatal episode.    MDM: SDOH neighborhood: [place] [address], language: Brigitte, MDM: 40 minutes spent on the date of the encounter doing chart review, history and exam, documentation and further activities per the note.    "

## 2024-05-22 ENCOUNTER — PRENATAL OFFICE VISIT (OUTPATIENT)
Dept: FAMILY MEDICINE | Facility: CLINIC | Age: 29
End: 2024-05-22
Payer: COMMERCIAL

## 2024-05-22 VITALS
RESPIRATION RATE: 16 BRPM | HEART RATE: 87 BPM | TEMPERATURE: 97.8 F | HEIGHT: 64 IN | OXYGEN SATURATION: 100 % | WEIGHT: 123 LBS | BODY MASS INDEX: 21 KG/M2 | SYSTOLIC BLOOD PRESSURE: 102 MMHG | DIASTOLIC BLOOD PRESSURE: 63 MMHG

## 2024-05-22 DIAGNOSIS — Z34.92 ENCOUNTER FOR SUPERVISION OF NORMAL PREGNANCY IN SECOND TRIMESTER, UNSPECIFIED GRAVIDITY: Primary | ICD-10-CM

## 2024-05-22 PROCEDURE — 81511 FTL CGEN ABNOR FOUR ANAL: CPT | Mod: 90 | Performed by: FAMILY MEDICINE

## 2024-05-22 PROCEDURE — 99000 SPECIMEN HANDLING OFFICE-LAB: CPT | Performed by: FAMILY MEDICINE

## 2024-05-22 PROCEDURE — 36415 COLL VENOUS BLD VENIPUNCTURE: CPT | Performed by: FAMILY MEDICINE

## 2024-05-22 PROCEDURE — 99207 PR PRENATAL VISIT: CPT | Performed by: FAMILY MEDICINE

## 2024-05-22 PROCEDURE — 81003 URINALYSIS AUTO W/O SCOPE: CPT | Performed by: FAMILY MEDICINE

## 2024-05-22 NOTE — PROGRESS NOTES
No contractions, pelvic pain, bleeding. No FM yet.   Would like to collect quad screen today. We reviewed options of genetic screening.   Follow up four weeks.

## 2024-05-24 LAB
# FETUSES US: NORMAL
AFP MOM SERPL: 0.68
AFP SERPL-MCNC: 33 NG/ML
AGE - REPORTED: 31.8 YR
CURRENT SMOKER: NO
FAMILY MEMBER DISEASES HX: NO
GA METHOD: NORMAL
GA: NORMAL WK
HCG MOM SERPL: 1.46
HCG SERPL-ACNC: NORMAL IU/L
HX OF HEREDITARY DISORDERS: NO
IDDM PATIENT QL: NO
INHIBIN A MOM SERPL: 0.88
INHIBIN A SERPL-MCNC: 145 PG/ML
INTEGRATED SCN PATIENT-IMP: NORMAL
PATHOLOGY STUDY: NORMAL
SPECIMEN DRAWN SERPL: NORMAL
U ESTRIOL MOM SERPL: 0.9
U ESTRIOL SERPL-MCNC: 1.9 NG/ML

## 2024-06-19 ENCOUNTER — VIRTUAL VISIT (OUTPATIENT)
Dept: INTERPRETER SERVICES | Facility: CLINIC | Age: 29
End: 2024-06-19

## 2024-06-19 ENCOUNTER — PRENATAL OFFICE VISIT (OUTPATIENT)
Dept: FAMILY MEDICINE | Facility: CLINIC | Age: 29
End: 2024-06-19
Payer: COMMERCIAL

## 2024-06-19 VITALS
RESPIRATION RATE: 16 BRPM | BODY MASS INDEX: 22.71 KG/M2 | HEART RATE: 91 BPM | TEMPERATURE: 98.1 F | OXYGEN SATURATION: 99 % | SYSTOLIC BLOOD PRESSURE: 105 MMHG | DIASTOLIC BLOOD PRESSURE: 67 MMHG | HEIGHT: 64 IN | WEIGHT: 133 LBS

## 2024-06-19 DIAGNOSIS — Z34.90 ENCOUNTER FOR SUPERVISION OF NORMAL PREGNANCY, ANTEPARTUM, UNSPECIFIED GRAVIDITY: Primary | ICD-10-CM

## 2024-06-19 PROCEDURE — 99207 PR PRENATAL VISIT: CPT | Performed by: FAMILY MEDICINE

## 2024-06-19 PROCEDURE — 81003 URINALYSIS AUTO W/O SCOPE: CPT | Performed by: FAMILY MEDICINE

## 2024-06-19 PROCEDURE — T1013 SIGN LANG/ORAL INTERPRETER: HCPCS | Mod: U4

## 2024-06-19 NOTE — PROGRESS NOTES
Feeling well. No ctx, lof, bleeding. +FM   Reviewed quad screen.   Will schedule fetal survey.   Follow up in one month.   Will do one hour glucose at that time.

## 2024-06-27 ENCOUNTER — HOSPITAL ENCOUNTER (OUTPATIENT)
Dept: ULTRASOUND IMAGING | Facility: HOSPITAL | Age: 29
Discharge: HOME | End: 2024-06-27
Attending: FAMILY MEDICINE | Admitting: FAMILY MEDICINE
Payer: COMMERCIAL

## 2024-06-27 DIAGNOSIS — Z34.90 ENCOUNTER FOR SUPERVISION OF NORMAL PREGNANCY, ANTEPARTUM, UNSPECIFIED GRAVIDITY: ICD-10-CM

## 2024-06-27 PROCEDURE — 76805 OB US >/= 14 WKS SNGL FETUS: CPT

## 2024-07-23 ENCOUNTER — PRENATAL OFFICE VISIT (OUTPATIENT)
Dept: FAMILY MEDICINE | Facility: CLINIC | Age: 29
End: 2024-07-23
Payer: COMMERCIAL

## 2024-07-23 VITALS
RESPIRATION RATE: 16 BRPM | TEMPERATURE: 97 F | SYSTOLIC BLOOD PRESSURE: 106 MMHG | HEART RATE: 85 BPM | DIASTOLIC BLOOD PRESSURE: 58 MMHG | BODY MASS INDEX: 25.1 KG/M2 | HEIGHT: 64 IN | WEIGHT: 147 LBS | OXYGEN SATURATION: 99 %

## 2024-07-23 DIAGNOSIS — Z34.90 ENCOUNTER FOR SUPERVISION OF NORMAL PREGNANCY, ANTEPARTUM, UNSPECIFIED GRAVIDITY: Primary | ICD-10-CM

## 2024-07-23 LAB
ALBUMIN UR-MCNC: NEGATIVE MG/DL
GLUCOSE 1H P 50 G GLC PO SERPL-MCNC: 91 MG/DL (ref 70–129)
GLUCOSE UR STRIP-MCNC: NEGATIVE MG/DL
HGB BLD-MCNC: 10.4 G/DL (ref 11.7–15.7)
KETONES UR STRIP-MCNC: NEGATIVE MG/DL

## 2024-07-23 PROCEDURE — 82950 GLUCOSE TEST: CPT | Performed by: FAMILY MEDICINE

## 2024-07-23 PROCEDURE — 36415 COLL VENOUS BLD VENIPUNCTURE: CPT | Performed by: FAMILY MEDICINE

## 2024-07-23 PROCEDURE — 86780 TREPONEMA PALLIDUM: CPT | Performed by: FAMILY MEDICINE

## 2024-07-23 PROCEDURE — 99207 PR PRENATAL VISIT: CPT | Performed by: FAMILY MEDICINE

## 2024-07-23 PROCEDURE — 81003 URINALYSIS AUTO W/O SCOPE: CPT | Performed by: FAMILY MEDICINE

## 2024-07-24 ENCOUNTER — TELEPHONE (OUTPATIENT)
Dept: FAMILY MEDICINE | Facility: CLINIC | Age: 29
End: 2024-07-24
Payer: COMMERCIAL

## 2024-07-24 LAB — T PALLIDUM AB SER QL: NONREACTIVE

## 2024-07-24 NOTE — TELEPHONE ENCOUNTER
----- Message from Adriana Zhang sent at 7/24/2024  9:20 AM CDT -----  Please call. Her glucose test was normal, no diabetes. Her hemoglobin is ok, still a little low. Keep taking iron daily please.

## 2024-08-21 ENCOUNTER — PRENATAL OFFICE VISIT (OUTPATIENT)
Dept: FAMILY MEDICINE | Facility: CLINIC | Age: 29
End: 2024-08-21
Payer: COMMERCIAL

## 2024-08-21 VITALS
DIASTOLIC BLOOD PRESSURE: 62 MMHG | BODY MASS INDEX: 27.31 KG/M2 | OXYGEN SATURATION: 98 % | WEIGHT: 160 LBS | HEIGHT: 64 IN | TEMPERATURE: 97.7 F | SYSTOLIC BLOOD PRESSURE: 110 MMHG | HEART RATE: 94 BPM

## 2024-08-21 DIAGNOSIS — D69.6 THROMBOCYTOPENIA (H): ICD-10-CM

## 2024-08-21 DIAGNOSIS — D64.9 ANEMIA, UNSPECIFIED TYPE: ICD-10-CM

## 2024-08-21 DIAGNOSIS — Z34.90 ENCOUNTER FOR SUPERVISION OF NORMAL PREGNANCY, ANTEPARTUM, UNSPECIFIED GRAVIDITY: Primary | ICD-10-CM

## 2024-08-21 PROCEDURE — 81003 URINALYSIS AUTO W/O SCOPE: CPT | Performed by: FAMILY MEDICINE

## 2024-08-21 PROCEDURE — 99207 PR PRENATAL VISIT: CPT | Performed by: FAMILY MEDICINE

## 2024-08-21 NOTE — PROGRESS NOTES
Feeling well. No ctx, lof, bleeding. +FM   Plans to breast and bottle feeding.   Plans natural birth.

## 2024-09-10 ENCOUNTER — PRENATAL OFFICE VISIT (OUTPATIENT)
Dept: FAMILY MEDICINE | Facility: CLINIC | Age: 29
End: 2024-09-10
Payer: COMMERCIAL

## 2024-09-10 VITALS
WEIGHT: 167 LBS | HEART RATE: 93 BPM | HEIGHT: 64 IN | BODY MASS INDEX: 28.51 KG/M2 | RESPIRATION RATE: 16 BRPM | TEMPERATURE: 97 F | SYSTOLIC BLOOD PRESSURE: 120 MMHG | DIASTOLIC BLOOD PRESSURE: 76 MMHG | OXYGEN SATURATION: 97 %

## 2024-09-10 DIAGNOSIS — Z34.93 ENCOUNTER FOR SUPERVISION OF NORMAL PREGNANCY IN THIRD TRIMESTER, UNSPECIFIED GRAVIDITY: Primary | ICD-10-CM

## 2024-09-10 PROCEDURE — 90472 IMMUNIZATION ADMIN EACH ADD: CPT | Performed by: FAMILY MEDICINE

## 2024-09-10 PROCEDURE — 99207 PR PRENATAL VISIT: CPT | Performed by: FAMILY MEDICINE

## 2024-09-10 PROCEDURE — 90715 TDAP VACCINE 7 YRS/> IM: CPT | Performed by: FAMILY MEDICINE

## 2024-09-10 PROCEDURE — 90656 IIV3 VACC NO PRSV 0.5 ML IM: CPT | Performed by: FAMILY MEDICINE

## 2024-09-10 PROCEDURE — 90471 IMMUNIZATION ADMIN: CPT | Performed by: FAMILY MEDICINE

## 2024-09-10 PROCEDURE — 81003 URINALYSIS AUTO W/O SCOPE: CPT | Performed by: FAMILY MEDICINE

## 2024-09-10 NOTE — PROGRESS NOTES
Feeling. Contractions once and while, they go away on their own.   No lof, bleeding. +FM.   Tdap and influenza vaccine today.   GBS next visit.   Will monitor fundal height.

## 2024-09-24 ENCOUNTER — PRENATAL OFFICE VISIT (OUTPATIENT)
Dept: FAMILY MEDICINE | Facility: CLINIC | Age: 29
End: 2024-09-24
Payer: COMMERCIAL

## 2024-09-24 VITALS
TEMPERATURE: 97.7 F | HEIGHT: 64 IN | SYSTOLIC BLOOD PRESSURE: 118 MMHG | BODY MASS INDEX: 29.02 KG/M2 | DIASTOLIC BLOOD PRESSURE: 78 MMHG | WEIGHT: 170 LBS | RESPIRATION RATE: 16 BRPM | HEART RATE: 100 BPM | OXYGEN SATURATION: 98 %

## 2024-09-24 DIAGNOSIS — D69.6 THROMBOCYTOPENIA (H): ICD-10-CM

## 2024-09-24 DIAGNOSIS — Z34.93 ENCOUNTER FOR SUPERVISION OF NORMAL PREGNANCY IN THIRD TRIMESTER, UNSPECIFIED GRAVIDITY: Primary | ICD-10-CM

## 2024-09-24 DIAGNOSIS — D64.9 ANEMIA, UNSPECIFIED TYPE: ICD-10-CM

## 2024-09-24 PROCEDURE — 87653 STREP B DNA AMP PROBE: CPT | Performed by: FAMILY MEDICINE

## 2024-09-24 PROCEDURE — 99207 PR PRENATAL VISIT: CPT | Performed by: FAMILY MEDICINE

## 2024-09-24 PROCEDURE — 81003 URINALYSIS AUTO W/O SCOPE: CPT | Performed by: FAMILY MEDICINE

## 2024-09-24 NOTE — PROGRESS NOTES
Some contractions since last night until she sat for this appointment. No lof, bleeding. +FM   Would like CE.   GBS collected. CE as above. Reviewed labor precautions.   Declines covid vaccine.

## 2024-09-25 LAB — GP B STREP DNA SPEC QL NAA+PROBE: NEGATIVE

## 2024-10-08 ENCOUNTER — PRENATAL OFFICE VISIT (OUTPATIENT)
Dept: FAMILY MEDICINE | Facility: CLINIC | Age: 29
End: 2024-10-08
Payer: COMMERCIAL

## 2024-10-08 VITALS
WEIGHT: 176 LBS | TEMPERATURE: 97.7 F | OXYGEN SATURATION: 98 % | DIASTOLIC BLOOD PRESSURE: 73 MMHG | HEIGHT: 64 IN | BODY MASS INDEX: 30.05 KG/M2 | RESPIRATION RATE: 21 BRPM | SYSTOLIC BLOOD PRESSURE: 115 MMHG | HEART RATE: 102 BPM

## 2024-10-08 DIAGNOSIS — Z34.93 ENCOUNTER FOR SUPERVISION OF NORMAL PREGNANCY IN THIRD TRIMESTER, UNSPECIFIED GRAVIDITY: Primary | ICD-10-CM

## 2024-10-08 PROCEDURE — 91320 SARSCV2 VAC 30MCG TRS-SUC IM: CPT | Performed by: FAMILY MEDICINE

## 2024-10-08 PROCEDURE — 99207 PR PRENATAL VISIT: CPT | Performed by: FAMILY MEDICINE

## 2024-10-08 PROCEDURE — 90480 ADMN SARSCOV2 VAC 1/ONLY CMP: CPT | Performed by: FAMILY MEDICINE

## 2024-10-08 PROCEDURE — 81003 URINALYSIS AUTO W/O SCOPE: CPT | Performed by: FAMILY MEDICINE

## 2024-10-08 NOTE — PROGRESS NOTES
No ctx this week. Some pelvic pressure. No bleeding or LOF. +FM.   CE as above.   Covid vaccine today.   Labor precautions.

## 2024-10-10 ENCOUNTER — HOSPITAL ENCOUNTER (INPATIENT)
Facility: HOSPITAL | Age: 29
LOS: 1 days | Discharge: HOME | End: 2024-10-11
Attending: FAMILY MEDICINE | Admitting: FAMILY MEDICINE
Payer: COMMERCIAL

## 2024-10-10 PROBLEM — O47.9 UTERINE CONTRACTIONS: Status: ACTIVE | Noted: 2024-10-10

## 2024-10-10 PROBLEM — Z36.89 ENCOUNTER FOR TRIAGE IN PREGNANT PATIENT: Status: ACTIVE | Noted: 2024-10-10

## 2024-10-10 LAB
ABO/RH(D): NORMAL
ANTIBODY SCREEN: NEGATIVE
ERYTHROCYTE [DISTWIDTH] IN BLOOD BY AUTOMATED COUNT: 13.2 % (ref 10–15)
HCT VFR BLD AUTO: 32.6 % (ref 35–47)
HGB BLD-MCNC: 10.5 G/DL (ref 11.7–15.7)
HGB BLD-MCNC: 10.6 G/DL (ref 11.7–15.7)
MCH RBC QN AUTO: 29.5 PG (ref 26.5–33)
MCHC RBC AUTO-ENTMCNC: 32.2 G/DL (ref 31.5–36.5)
MCV RBC AUTO: 92 FL (ref 78–100)
PLATELET # BLD AUTO: 129 10E3/UL (ref 150–450)
RBC # BLD AUTO: 3.56 10E6/UL (ref 3.8–5.2)
SPECIMEN EXPIRATION DATE: NORMAL
T PALLIDUM AB SER QL: NONREACTIVE
WBC # BLD AUTO: 18.9 10E3/UL (ref 4–11)

## 2024-10-10 PROCEDURE — 10907ZC DRAINAGE OF AMNIOTIC FLUID, THERAPEUTIC FROM PRODUCTS OF CONCEPTION, VIA NATURAL OR ARTIFICIAL OPENING: ICD-10-PCS | Performed by: FAMILY MEDICINE

## 2024-10-10 PROCEDURE — 120N000001 HC R&B MED SURG/OB

## 2024-10-10 PROCEDURE — 86901 BLOOD TYPING SEROLOGIC RH(D): CPT | Performed by: FAMILY MEDICINE

## 2024-10-10 PROCEDURE — 85018 HEMOGLOBIN: CPT | Performed by: FAMILY MEDICINE

## 2024-10-10 PROCEDURE — 250N000011 HC RX IP 250 OP 636: Performed by: FAMILY MEDICINE

## 2024-10-10 PROCEDURE — 59400 OBSTETRICAL CARE: CPT | Performed by: FAMILY MEDICINE

## 2024-10-10 PROCEDURE — 36415 COLL VENOUS BLD VENIPUNCTURE: CPT | Performed by: FAMILY MEDICINE

## 2024-10-10 PROCEDURE — 722N000001 HC LABOR CARE VAGINAL DELIVERY SINGLE

## 2024-10-10 PROCEDURE — 86900 BLOOD TYPING SEROLOGIC ABO: CPT | Performed by: FAMILY MEDICINE

## 2024-10-10 PROCEDURE — 86780 TREPONEMA PALLIDUM: CPT | Performed by: FAMILY MEDICINE

## 2024-10-10 PROCEDURE — 250N000009 HC RX 250: Performed by: FAMILY MEDICINE

## 2024-10-10 PROCEDURE — 250N000013 HC RX MED GY IP 250 OP 250 PS 637: Performed by: FAMILY MEDICINE

## 2024-10-10 PROCEDURE — 258N000003 HC RX IP 258 OP 636: Performed by: FAMILY MEDICINE

## 2024-10-10 RX ORDER — OXYTOCIN/0.9 % SODIUM CHLORIDE 30/500 ML
340 PLASTIC BAG, INJECTION (ML) INTRAVENOUS CONTINUOUS PRN
Status: DISCONTINUED | OUTPATIENT
Start: 2024-10-10 | End: 2024-10-11 | Stop reason: HOSPADM

## 2024-10-10 RX ORDER — KETOROLAC TROMETHAMINE 30 MG/ML
30 INJECTION, SOLUTION INTRAMUSCULAR; INTRAVENOUS
Status: COMPLETED | OUTPATIENT
Start: 2024-10-10 | End: 2024-10-10

## 2024-10-10 RX ORDER — LIDOCAINE 40 MG/G
CREAM TOPICAL
Status: DISCONTINUED | OUTPATIENT
Start: 2024-10-10 | End: 2024-10-10 | Stop reason: HOSPADM

## 2024-10-10 RX ORDER — METOCLOPRAMIDE HYDROCHLORIDE 5 MG/ML
10 INJECTION INTRAMUSCULAR; INTRAVENOUS EVERY 6 HOURS PRN
Status: DISCONTINUED | OUTPATIENT
Start: 2024-10-10 | End: 2024-10-10 | Stop reason: HOSPADM

## 2024-10-10 RX ORDER — LOPERAMIDE HYDROCHLORIDE 2 MG/1
2 CAPSULE ORAL
Status: DISCONTINUED | OUTPATIENT
Start: 2024-10-10 | End: 2024-10-10 | Stop reason: HOSPADM

## 2024-10-10 RX ORDER — OXYTOCIN/0.9 % SODIUM CHLORIDE 30/500 ML
100-340 PLASTIC BAG, INJECTION (ML) INTRAVENOUS CONTINUOUS PRN
Status: DISCONTINUED | OUTPATIENT
Start: 2024-10-10 | End: 2024-10-11 | Stop reason: HOSPADM

## 2024-10-10 RX ORDER — OXYTOCIN/0.9 % SODIUM CHLORIDE 30/500 ML
340 PLASTIC BAG, INJECTION (ML) INTRAVENOUS CONTINUOUS PRN
Status: DISCONTINUED | OUTPATIENT
Start: 2024-10-10 | End: 2024-10-10 | Stop reason: HOSPADM

## 2024-10-10 RX ORDER — ONDANSETRON 4 MG/1
4 TABLET, ORALLY DISINTEGRATING ORAL EVERY 6 HOURS PRN
Status: DISCONTINUED | OUTPATIENT
Start: 2024-10-10 | End: 2024-10-10 | Stop reason: HOSPADM

## 2024-10-10 RX ORDER — CARBOPROST TROMETHAMINE 250 UG/ML
250 INJECTION, SOLUTION INTRAMUSCULAR
Status: DISCONTINUED | OUTPATIENT
Start: 2024-10-10 | End: 2024-10-10 | Stop reason: HOSPADM

## 2024-10-10 RX ORDER — NALOXONE HYDROCHLORIDE 0.4 MG/ML
0.4 INJECTION, SOLUTION INTRAMUSCULAR; INTRAVENOUS; SUBCUTANEOUS
Status: DISCONTINUED | OUTPATIENT
Start: 2024-10-10 | End: 2024-10-10 | Stop reason: HOSPADM

## 2024-10-10 RX ORDER — OXYTOCIN 10 [USP'U]/ML
10 INJECTION, SOLUTION INTRAMUSCULAR; INTRAVENOUS
Status: DISCONTINUED | OUTPATIENT
Start: 2024-10-10 | End: 2024-10-10 | Stop reason: HOSPADM

## 2024-10-10 RX ORDER — DOCUSATE SODIUM 100 MG/1
100 CAPSULE, LIQUID FILLED ORAL DAILY
Status: DISCONTINUED | OUTPATIENT
Start: 2024-10-10 | End: 2024-10-11 | Stop reason: HOSPADM

## 2024-10-10 RX ORDER — TRANEXAMIC ACID 10 MG/ML
1 INJECTION, SOLUTION INTRAVENOUS EVERY 30 MIN PRN
Status: DISCONTINUED | OUTPATIENT
Start: 2024-10-10 | End: 2024-10-10 | Stop reason: HOSPADM

## 2024-10-10 RX ORDER — ONDANSETRON 2 MG/ML
4 INJECTION INTRAMUSCULAR; INTRAVENOUS EVERY 6 HOURS PRN
Status: DISCONTINUED | OUTPATIENT
Start: 2024-10-10 | End: 2024-10-10 | Stop reason: HOSPADM

## 2024-10-10 RX ORDER — LOPERAMIDE HYDROCHLORIDE 2 MG/1
2 CAPSULE ORAL
Status: DISCONTINUED | OUTPATIENT
Start: 2024-10-10 | End: 2024-10-11 | Stop reason: HOSPADM

## 2024-10-10 RX ORDER — NALOXONE HYDROCHLORIDE 0.4 MG/ML
0.2 INJECTION, SOLUTION INTRAMUSCULAR; INTRAVENOUS; SUBCUTANEOUS
Status: DISCONTINUED | OUTPATIENT
Start: 2024-10-10 | End: 2024-10-10 | Stop reason: HOSPADM

## 2024-10-10 RX ORDER — BISACODYL 10 MG
10 SUPPOSITORY, RECTAL RECTAL DAILY PRN
Status: DISCONTINUED | OUTPATIENT
Start: 2024-10-10 | End: 2024-10-11 | Stop reason: HOSPADM

## 2024-10-10 RX ORDER — MISOPROSTOL 200 UG/1
400 TABLET ORAL
Status: DISCONTINUED | OUTPATIENT
Start: 2024-10-10 | End: 2024-10-11 | Stop reason: HOSPADM

## 2024-10-10 RX ORDER — CITRIC ACID/SODIUM CITRATE 334-500MG
30 SOLUTION, ORAL ORAL
Status: DISCONTINUED | OUTPATIENT
Start: 2024-10-10 | End: 2024-10-10 | Stop reason: HOSPADM

## 2024-10-10 RX ORDER — METHYLERGONOVINE MALEATE 0.2 MG/ML
200 INJECTION INTRAVENOUS
Status: DISCONTINUED | OUTPATIENT
Start: 2024-10-10 | End: 2024-10-11 | Stop reason: HOSPADM

## 2024-10-10 RX ORDER — PROCHLORPERAZINE MALEATE 10 MG
10 TABLET ORAL EVERY 6 HOURS PRN
Status: DISCONTINUED | OUTPATIENT
Start: 2024-10-10 | End: 2024-10-10 | Stop reason: HOSPADM

## 2024-10-10 RX ORDER — IBUPROFEN 800 MG/1
800 TABLET, FILM COATED ORAL EVERY 6 HOURS PRN
Status: DISCONTINUED | OUTPATIENT
Start: 2024-10-10 | End: 2024-10-11 | Stop reason: HOSPADM

## 2024-10-10 RX ORDER — TRANEXAMIC ACID 10 MG/ML
1 INJECTION, SOLUTION INTRAVENOUS EVERY 30 MIN PRN
Status: DISCONTINUED | OUTPATIENT
Start: 2024-10-10 | End: 2024-10-11 | Stop reason: HOSPADM

## 2024-10-10 RX ORDER — METOCLOPRAMIDE 10 MG/1
10 TABLET ORAL EVERY 6 HOURS PRN
Status: DISCONTINUED | OUTPATIENT
Start: 2024-10-10 | End: 2024-10-10 | Stop reason: HOSPADM

## 2024-10-10 RX ORDER — MISOPROSTOL 200 UG/1
800 TABLET ORAL
Status: DISCONTINUED | OUTPATIENT
Start: 2024-10-10 | End: 2024-10-11 | Stop reason: HOSPADM

## 2024-10-10 RX ORDER — METHYLERGONOVINE MALEATE 0.2 MG/ML
200 INJECTION INTRAVENOUS
Status: DISCONTINUED | OUTPATIENT
Start: 2024-10-10 | End: 2024-10-10 | Stop reason: HOSPADM

## 2024-10-10 RX ORDER — FENTANYL CITRATE 50 UG/ML
100 INJECTION, SOLUTION INTRAMUSCULAR; INTRAVENOUS
Status: DISCONTINUED | OUTPATIENT
Start: 2024-10-10 | End: 2024-10-10 | Stop reason: HOSPADM

## 2024-10-10 RX ORDER — LOPERAMIDE HYDROCHLORIDE 2 MG/1
4 CAPSULE ORAL
Status: DISCONTINUED | OUTPATIENT
Start: 2024-10-10 | End: 2024-10-11 | Stop reason: HOSPADM

## 2024-10-10 RX ORDER — OXYTOCIN 10 [USP'U]/ML
10 INJECTION, SOLUTION INTRAMUSCULAR; INTRAVENOUS
Status: DISCONTINUED | OUTPATIENT
Start: 2024-10-10 | End: 2024-10-11 | Stop reason: HOSPADM

## 2024-10-10 RX ORDER — MODIFIED LANOLIN
OINTMENT (GRAM) TOPICAL
Status: DISCONTINUED | OUTPATIENT
Start: 2024-10-10 | End: 2024-10-11 | Stop reason: HOSPADM

## 2024-10-10 RX ORDER — MISOPROSTOL 200 UG/1
400 TABLET ORAL
Status: DISCONTINUED | OUTPATIENT
Start: 2024-10-10 | End: 2024-10-10 | Stop reason: HOSPADM

## 2024-10-10 RX ORDER — ACETAMINOPHEN 325 MG/1
650 TABLET ORAL EVERY 4 HOURS PRN
Status: DISCONTINUED | OUTPATIENT
Start: 2024-10-10 | End: 2024-10-11 | Stop reason: HOSPADM

## 2024-10-10 RX ORDER — IBUPROFEN 800 MG/1
800 TABLET, FILM COATED ORAL
Status: COMPLETED | OUTPATIENT
Start: 2024-10-10 | End: 2024-10-10

## 2024-10-10 RX ORDER — HYDROCORTISONE 25 MG/G
CREAM TOPICAL 3 TIMES DAILY PRN
Status: DISCONTINUED | OUTPATIENT
Start: 2024-10-10 | End: 2024-10-11 | Stop reason: HOSPADM

## 2024-10-10 RX ORDER — LOPERAMIDE HYDROCHLORIDE 2 MG/1
4 CAPSULE ORAL
Status: DISCONTINUED | OUTPATIENT
Start: 2024-10-10 | End: 2024-10-10 | Stop reason: HOSPADM

## 2024-10-10 RX ORDER — CARBOPROST TROMETHAMINE 250 UG/ML
250 INJECTION, SOLUTION INTRAMUSCULAR
Status: DISCONTINUED | OUTPATIENT
Start: 2024-10-10 | End: 2024-10-11 | Stop reason: HOSPADM

## 2024-10-10 RX ORDER — MISOPROSTOL 200 UG/1
800 TABLET ORAL
Status: DISCONTINUED | OUTPATIENT
Start: 2024-10-10 | End: 2024-10-10 | Stop reason: HOSPADM

## 2024-10-10 RX ADMIN — DOCUSATE SODIUM 100 MG: 100 CAPSULE, LIQUID FILLED ORAL at 08:53

## 2024-10-10 RX ADMIN — KETOROLAC TROMETHAMINE 30 MG: 30 INJECTION, SOLUTION INTRAMUSCULAR at 07:35

## 2024-10-10 RX ADMIN — ACETAMINOPHEN 650 MG: 325 TABLET ORAL at 16:10

## 2024-10-10 RX ADMIN — MISOPROSTOL 800 MCG: 200 TABLET ORAL at 07:10

## 2024-10-10 RX ADMIN — Medication 340 ML/HR: at 07:09

## 2024-10-10 RX ADMIN — ACETAMINOPHEN 650 MG: 325 TABLET ORAL at 20:10

## 2024-10-10 RX ADMIN — Medication 100 ML/HR: at 08:51

## 2024-10-10 RX ADMIN — SODIUM CHLORIDE, POTASSIUM CHLORIDE, SODIUM LACTATE AND CALCIUM CHLORIDE 500 ML: 600; 310; 30; 20 INJECTION, SOLUTION INTRAVENOUS at 05:52

## 2024-10-10 RX ADMIN — IBUPROFEN 800 MG: 800 TABLET ORAL at 16:10

## 2024-10-10 RX ADMIN — IBUPROFEN 800 MG: 800 TABLET ORAL at 22:28

## 2024-10-10 ASSESSMENT — ACTIVITIES OF DAILY LIVING (ADL)
ADLS_ACUITY_SCORE: 18
ADLS_ACUITY_SCORE: 35
ADLS_ACUITY_SCORE: 18

## 2024-10-10 NOTE — PROVIDER NOTIFICATION
Luz Maria Loera MD notified of patient's borderline Temp of 100 and HR of 117. MD states to continue to monitor with current plan of care, she will discuss patient with Dr Zhang and call back if any further orders or interventions are needed.     FARHAT CLARK RN on 10/10/2024 at 4:26 PM

## 2024-10-10 NOTE — H&P
"[facility] Labor and Delivery History and Physical    Yesenia Pickett MRN# [id number]   Age: 29 year old YOB: 1995     Date of Admission:  10/10/2024    Primary care provider: No Ref-Primary, Physician           Chief Complaint:   Yesenia Pickett is a 29 year old female who is 37w5d pregnant and being admitted for active labor management. Her contractions started at 1 am, became more regular and stronger at 3 am. She came to the hospital at 5 am, was found to be 4 cm with regular contractions. She changed quickly and became very uncomfortable by 6:30 am and I was called for delivery due to 6 cm dilated.           Pregnancy history:     OBSTETRIC HISTORY:    OB History    Para Term  AB Living   3 2 2 0 0 2   SAB IAB Ectopic Multiple Live Births   0 0 0 0 2      # Outcome Date GA Lbr Kavon/2nd Weight Sex Type Anes PTL Lv   3 Current            2 Term 21 37w0d 04:15 / 00:22 2.977 kg (6 lb 9 oz) F Vag-Spont Local N DADA      Name: KEVENFEMALE-YESENIA      Apgar1: 9  Apgar5: 9   1 Term 10/21/15 39w6d 03:43 / 01:22 3.402 kg (7 lb 8 oz) F Vag-Spont Local N DADA      Name: Shanti      Apgar1: 8  Apgar5: 9       EDC: Estimated Date of Delivery: 10/26/24    Prenatal Labs:   Lab Results   Component Value Date    AS Negative 10/10/2024    HEPBANG Nonreactive 2024    GCPCRT Negative 2021    HGB 10.6 (L) 10/10/2024       GBS Status:   No results found for: \"GBS\"    Active Problem List  Patient Active Problem List   Diagnosis    Family history of congenital heart disease in sister    Seasonal allergies    Thrombocytopenia (H)    Anemia, unspecified type    Normal vaginal delivery    Less than 8 weeks gestation of pregnancy    Encounter for triage in pregnant patient    Uterine contractions    Other immediate postpartum hemorrhage       Medication Prior to Admission  Medications Prior to Admission   Medication Sig Dispense Refill Last Dose    ferrous gluconate (FERGON) 324 (38 " Fe) MG tablet Take 1 tablet (324 mg) by mouth daily (with breakfast) 90 tablet 3 Past Week    Prenatal MV-Min-Fe Fum-FA-DHA (PRENATAL MULTIVITAMIN PLUS DHA) 27-0.8-250 MG CAPS Take 1 tablet by mouth daily 30 capsule 12 Past Week   .        Maternal Past Medical History:     Past Medical History:   Diagnosis Date    Anemia     Bleeding disorder (H)     Female infertility     Infertility counseling 10/22/2020     (normal spontaneous vaginal delivery) 2021    Other immediate postpartum hemorrhage 10/10/2024                       Family History:     Family History   Problem Relation Age of Onset    Heart Defect Sister     No Known Problems Father     No Known Problems Brother     No Known Problems Brother     No Known Problems Sister     No Known Problems Sister      Family history reviewed and updated in [facility]            Social History:     Social History     Tobacco Use    Smoking status: Never     Passive exposure: Never    Smokeless tobacco: Never   Substance Use Topics    Alcohol use: Never            Review of Systems:   CONSTITUTIONAL: NEGATIVE for fever, chills, change in weight  ENT/MOUTH: NEGATIVE for ear, mouth and throat problems  RESP: NEGATIVE for significant cough or SOB  CV: NEGATIVE for chest pain, palpitations or peripheral edema          Physical Exam:   Vitals were reviewed  Constitutional:   awake, alert, cooperative, no apparent distress, and appears stated age     Lungs:   No increased work of breathing, good air exchange, clear to auscultation bilaterally, no crackles or wheezing     Cardiovascular:   normal S1 and S2     Abdomen:   No scars, normal bowel sounds, soft, non-distended, non-tender, no masses palpated, no hepatosplenomegally     Neurologic:   Awake, alert, oriented to name, place and time.  Cranial nerves II-XII are grossly intact.  Motor is 5 out of 5 bilaterally.  Cerebellar finger to nose, heel to shin intact.  Sensory is intact.  Babinski down going, Romberg negative,  and gait is normal.     Skin:   no bruising or bleeding      Cervix:   Membranes: intact   Dilation: 6   Effacement: 90%   Station:-1   Consistency: soft   Position: Mid  Presentation:Cephalic  Fetal Heart Rate Tracing: reactive and reassuring  Tocometer: frequency q 3 minutes                       Assessment:   Yesenia Pickett is a 37w5d pregnant female admitted with active labor management.          Plan:   Admit - see IP orders  Pain medication as desired.   Patient reports fast labors. GBS negative. Will  bottle and breast feed.   Anticipate     Adriana Zhang MD

## 2024-10-10 NOTE — PLAN OF CARE
Goal Outcome Evaluation:      Plan of Care Reviewed With: patient    Overall Patient Progress: no changeOverall Patient Progress: no change         Patient complete at 0655  Patient pushing at 0655  Delivery of viable male at 0704  Baby placed skin to skin  Placenta delivered at 0707  Pitocin started per provider at 0707  Toradol/Ibuprofen given at 0735  Mitra Saul RN  10/10/2024 7:51 AM

## 2024-10-10 NOTE — PROGRESS NOTES
D:  Patient admitted to [facility]/EPAY15-2 via ambulation with  and support person.  A:  Bedside report received from Farhat Alicia RN at 15:45, Farhat HIGGINS also assisted in transferring pt to [facility] 23 at 17:00. Oriented patient and family to surroundings; call light within reach. 4 Part ID bands double checked with reporting RN.  R:  Patient and  tolerated transfer. Care assumed.    FARHAT CLARK RN on 10/10/2024 at 5:00 PM

## 2024-10-10 NOTE — PLAN OF CARE
Goal Outcome Evaluation:      Plan of Care Reviewed With: patient    Overall Patient Progress: no changeOverall Patient Progress: no change       Data: Patient presented to [facility]: 10/10/2024  4:56 AM.  Reason for maternal/fetal assessment is uterine contractions. Patient reports contractions started at 0100. Patient denies leaking of vaginal fluid/rupture of membranes, vaginal bleeding, abdominal pain, pelvic pressure, nausea, vomiting, headache, visual disturbances, epigastric or RUQ pain, significant edema. Patient reports fetal movement is normal. Patient is a 37w5d . Prenatal record reviewed. Pregnancy has been uncomplicated. Support person is present.     Fetal HR baseline was  , variability is  . Accelerations:  . Decelerations:  . Uterine assessment is   during contractions and   at rest. Cervix 4 cm dilated and 70% effaced. Fetal station -2. Fetal presentation   per [place]. Membranes: intact.    Vital signs wnl. Patient reports pain and is coping.     Action: Verbal consent for EFM. Triage assessment completed. Patient does not meet criteria for early labor discharge.     Response: Patient verbalized agreement with plan. Will contact Dr. Zhang with update and for further orders.  Mitra Saul RN  10/10/2024 5:19 AM

## 2024-10-10 NOTE — L&D DELIVERY NOTE
OB Vaginal Delivery Note    Yesenia Pickett MRN# [id number]   Age: 29 year old YOB: 1995       GA: 37w5d  GP:   Labor Complications: None   EBL:   mL  Delivery QBL: 45 mL  Delivery Type: Vaginal, Spontaneous   ROM to Delivery Time: (Delivered) Minutes: 6  [place] Weight: 3.55 kg (7 lb 13.2 oz)    1 Minute 5 Minute 10 Minute   Apgar Totals: 8   9        EDIE SCHNEIDER;NANNETTE TALLEY     Delivery Details:  Yesenia Pickett, a 29 year old  female delivered a viable infant with apgars of 8  and 9 . Patient was fully dilated and pushing after   hours   minutes in active labor. Delivery was via vaginal, spontaneous  to a sterile field under none  anesthesia. Infant delivered in vertex  right  occiput  anterior  position. Anterior and posterior shoulders delivered without difficulty. The cord was clamped, cut twice and 3 vessels  were noted. Cord blood was obtained in routine fashion with the following disposition: discard .      Cord complications: none   Placenta delivered at 10/10/2024  7:07 AM . Placental disposition was Hospital disposal . Fundal massage performed and fundus found to be firm.     Episiotomy: none    Perineum, vagina, cervix were inspected, and the following lacerations were noted:   Perineal lacerations: none              There was brisk vaginal bleeding immediately following delivery.   Cytotec rectal 800 mcg was given.   Instructed RN to increase Pitocin infusion.   Following this bleeding has improved.   Will monitor closely.     Excellent hemostasis was noted. Needle count correct. Infant and patient in delivery room in good and stable condition.        Blanca Pickett [[id number]]      Labor Event Times      Latent labor onset date/time: 10/10/2024 0100    Active labor onset date: 10/10/24 Onset time:  5:30 AM   Start pushing date/time: 10/10/2024 0655          Labor Events     labor?: No     Rupture identifier: Sac 1  Rupture date/time: 10/10/24 0658   Rupture type:  "Artificial Rupture of Membranes  Fluid color: Clear  Fluid odor: Normal     Augmentation: None       Delivery/Placenta Date and Time      Delivery Date: 10/10/24 Delivery Time:  7:04 AM   Placenta Date/Time: 10/10/2024  7:07 AM  Oxytocin given at the time of delivery: after delivery of placenta  Delivering clinician: Adriana Zhang MD   Other personnel present at delivery:  Provider Role   Mitra Schneider RN Craig, Molly M, RN              Vaginal Counts       Initial count performed by 2 team members:  Two Team Members   Leatha SCHNEIDER         Needles Suture Needles Sponges (RETIRED) Instruments   Initial counts 0 0 5    Added to count       Relief counts       Final counts 0 0 5            Placed during labor Accounted for at the end of labor   FSE NA NA   IUPC NA NA   Cervidil NA NA                  Final count performed by 2 team members:  Two Team Members   Leatha Schneider      Final count correct?: Yes  Pre-Birth Team Brief: Complete  Post-Birth Team Debrief: Complete       Apgars    Living status: Living   1 Minute 5 Minute 10 Minute 15 Minute 20 Minute   Skin color: 0  1       Heart rate: 2  2       Reflex irritability: 2  2       Muscle tone: 2  2       Respiratory effort: 2  2       Total: 8  9       Apgars assigned by: WYATT       Cord      Vessels: 3 Vessels    Cord Complications: None               Cord Blood Disposition: Discard    Gases Sent?: No    Delayed cord clamping?: Yes    Cord Clamping Delay (seconds):  seconds    Stem cell collection?: No            Resuscitation    Methods: None  Output in Delivery Room: Voided        Measurements      Weight: 7 lb 13.2 oz Length: 1' 7.5\"     Head circumference: 34 cm    Output in delivery room: Voided       Skin to Skin and Feeding Plan      Skin to skin initiation date/time: 1/10/1841    Skin to skin with: Mother  Skin to skin end date/time:            Labor Events and Shoulder Dystocia    Fetal Tracing Prior to Delivery: Category " 1  Shoulder dystocia present?: Neg       Delivery (Maternal) (Provider to Complete) ([id number])    Episiotomy: None  Perineal lacerations: None    Repair suture: None  Genital tract inspection done: Pos       Blood Loss  Mother: Yesenia Pickett #[id number]     Start of Mother's Information      Delivery Blood Loss  10/10/24 0530 - 10/10/24 0747      Delivery QBL (mL) Hospital Encounter 545 mL    Total  545 mL               End of Mother's Information  Mother: Yesenia Pickett #[id number]                Delivery - Provider to Complete ([id number])    Delivering clinician: Adriana Zhang MD  Delivery Type (Choose the 1 that will go to the Birth History): Vaginal, Spontaneous                         Other personnel:  Provider Role   Mitra Saul RN    LesLiliane zepeda RN                     Placenta    Date/Time: 10/10/2024  7:07 AM  Removal: Spontaneous  Disposition: Hospital disposal             Anesthesia    Method: None                    Presentation and Position    Presentation: Vertex    Position: Right Occiput Anterior                     Adriana Zhang MD

## 2024-10-10 NOTE — PROGRESS NOTES
Report received from Farhat Alicia RN at 15:45; Will transfer to  room 23 once the room is clean and available.     FARHAT CLARK RN on 10/10/2024 at 4:22 PM

## 2024-10-11 VITALS
HEART RATE: 82 BPM | SYSTOLIC BLOOD PRESSURE: 117 MMHG | RESPIRATION RATE: 18 BRPM | BODY MASS INDEX: 31.18 KG/M2 | TEMPERATURE: 97.6 F | DIASTOLIC BLOOD PRESSURE: 70 MMHG | HEIGHT: 63 IN | WEIGHT: 176 LBS | OXYGEN SATURATION: 99 %

## 2024-10-11 PROBLEM — O47.9 UTERINE CONTRACTIONS: Status: RESOLVED | Noted: 2024-10-10 | Resolved: 2024-10-11

## 2024-10-11 PROBLEM — D62 ANEMIA DUE TO BLOOD LOSS, ACUTE: Status: ACTIVE | Noted: 2024-10-11

## 2024-10-11 PROBLEM — Z36.89 ENCOUNTER FOR TRIAGE IN PREGNANT PATIENT: Status: RESOLVED | Noted: 2024-10-10 | Resolved: 2024-10-11

## 2024-10-11 LAB — HGB BLD-MCNC: 9.7 G/DL (ref 11.7–15.7)

## 2024-10-11 PROCEDURE — 36415 COLL VENOUS BLD VENIPUNCTURE: CPT | Performed by: FAMILY MEDICINE

## 2024-10-11 PROCEDURE — 99207 PR SUBSEQUENT HOSPITAL CARE FOR MOTHER, 15 MINUTES: CPT | Performed by: FAMILY MEDICINE

## 2024-10-11 PROCEDURE — 250N000013 HC RX MED GY IP 250 OP 250 PS 637: Performed by: FAMILY MEDICINE

## 2024-10-11 PROCEDURE — 85018 HEMOGLOBIN: CPT | Performed by: FAMILY MEDICINE

## 2024-10-11 RX ORDER — IBUPROFEN 800 MG/1
800 TABLET, FILM COATED ORAL EVERY 6 HOURS PRN
Qty: 30 TABLET | Refills: 0 | Status: SHIPPED | OUTPATIENT
Start: 2024-10-11

## 2024-10-11 RX ORDER — ACETAMINOPHEN 325 MG/1
650 TABLET ORAL EVERY 4 HOURS PRN
Qty: 100 TABLET | Refills: 0 | Status: SHIPPED | OUTPATIENT
Start: 2024-10-11

## 2024-10-11 RX ORDER — DOCUSATE SODIUM 100 MG/1
100 CAPSULE, LIQUID FILLED ORAL DAILY
Qty: 30 CAPSULE | Refills: 0 | Status: SHIPPED | OUTPATIENT
Start: 2024-10-12

## 2024-10-11 RX ADMIN — IBUPROFEN 800 MG: 800 TABLET ORAL at 05:34

## 2024-10-11 RX ADMIN — ACETAMINOPHEN 650 MG: 325 TABLET ORAL at 00:26

## 2024-10-11 RX ADMIN — ACETAMINOPHEN 650 MG: 325 TABLET ORAL at 08:23

## 2024-10-11 RX ADMIN — DOCUSATE SODIUM 100 MG: 100 CAPSULE, LIQUID FILLED ORAL at 08:23

## 2024-10-11 RX ADMIN — ACETAMINOPHEN 650 MG: 325 TABLET ORAL at 04:22

## 2024-10-11 ASSESSMENT — ACTIVITIES OF DAILY LIVING (ADL)
ADLS_ACUITY_SCORE: 18

## 2024-10-11 NOTE — DISCHARGE SUMMARY
Maternal Discharge Summary  [facility]  Date of Service: 10/11/2024    Name      Yesenia Pickett         1995  MRN       [id number]  PCP        Dr. Zhang at [facility], None.    Admission Date:  10/10/2024  Discharge Date:  10/11/2024  ________________________________________________________________________    Assessment:  Yesenia Pickett is a 29 year old now  s/p vaginal, spontaneous  at 37w5d.  Principal Problem:    Normal vaginal delivery  Active Problems:    Other immediate postpartum hemorrhage    Anemia due to blood loss, acute      Discharge Plan:   Discharge to Home. Condition at Discharge:  Stable  Physical activity:as tolerated  Watch for postpartum warning signs as discussed  Diet:  as tolerated  Home care nurse: ordered for 1-2 days from now.  Lactation clinic appointment: ordered.  Contraception plan: undecided, will follow up at postpartum visit.  Follow up with Dr. Zhang, Physician in 4 weeks.  Future Appointments   Date Time Provider Department Center   10/15/2024  2:20 PM [facility]   10/22/2024  2:20 PM Adriana Zhang MD [facility]   10/29/2024 10:00 AM Adriana Zhang MD [facility]      8.   Immunizations:utd    ________________________________________________________________________      Estimated Date of Delivery: Data Unavailable  Gestational Age at Delivery: 37w5d    Principal Diagnosis: Labor and delivery  Delivery type: Vaginal, Spontaneous     Subjective:  Postpartum day #1 s/p NVD  Patient doing well with no concerns. Lochia is mild without clots.  Pain is well controlled with Ibuprofen.  Eating and ambulating well. Normal urine output. Normal mood and affect.  Patient denies headache, dizziness, dyspnea, and leg pain.   Lactation working with mom- good latch.  Seem to be giving baby too much formula- not hungry at breast, spitting up.    The baby is well and being fed by both breast  and bottle.     Temp up to 100 yesterday postpartum - no signs or sx of infection.  Rectal cytotec given.  Temp normal since then.  Mild tachycardia during and after labor resolved.  Hgb relatively stable over night.  10.5 to 9.7.  bp stable.  Clinically stable- will continue with PO iron and pnv postpartum     Has follow-up with Leatha 10/15   Did not get RSV in pregnancy     Discharge Exam:  Patient Vitals for the past 24 hrs:   BP Temp Temp src Pulse Resp SpO2   10/11/24 0750 117/70 97.6  F (36.4  C) Oral 82 18 99 %   10/11/24 0422 111/69 97.7  F (36.5  C) Oral 85 18 97 %   10/11/24 0026 106/56 98  F (36.7  C) Oral 81 22 97 %   10/10/24 1944 119/62 98.2  F (36.8  C) Oral 105 18 97 %   10/10/24 1554 118/73 100  F (37.8  C) Oral 117 18 96 %   10/10/24 1330 114/65 98.1  F (36.7  C) Oral 67 18 --     General - alert, comfortable  Heart - RRR, no murmurs  Lungs - CTA bilaterally  Abdomen - fundus firm, nontender, below umbilicus  Extremities - trace edema  Admission on 10/10/2024   Component Date Value Ref Range Status    Hemoglobin 10/10/2024 10.6 (L)  11.7 - 15.7 g/dL Final    Treponema Antibody Total 10/10/2024 Nonreactive  Nonreactive Final    ABO/RH(D) 10/10/2024 B POS   Final    Antibody Screen 10/10/2024 Negative  Negative Final    SPECIMEN EXPIRATION DATE 10/10/2024 [id number]   Final    WBC Count 10/10/2024 18.9 (H)  4.0 - 11.0 10e3/uL Final    RBC Count 10/10/2024 3.56 (L)  3.80 - 5.20 10e6/uL Final    Hemoglobin 10/10/2024 10.5 (L)  11.7 - 15.7 g/dL Final    Hematocrit 10/10/2024 32.6 (L)  35.0 - 47.0 % Final    MCV 10/10/2024 92  78 - 100 fL Final    MCH 10/10/2024 29.5  26.5 - 33.0 pg Final    MCHC 10/10/2024 32.2  31.5 - 36.5 g/dL Final    RDW 10/10/2024 13.2  10.0 - 15.0 % Final    Platelet Count 10/10/2024 129 (L)  150 - 450 10e3/uL Final    Hemoglobin 10/11/2024 9.7 (L)  11.7 - 15.7 g/dL Final   Prenatal Office Visit on 10/08/2024   Component Date Value Ref Range Status    Glucose Urine 10/08/2024  Negative  Negative mg/dL Final    Ketones Urine 10/08/2024 Negative  Negative mg/dL Final    Protein Albumin Urine 10/08/2024 Negative  Negative mg/dL Final      Discharge Medications:   See medication reconciliation.     Completed by:   Luz Maria Loera MD  [facility]  10/11/2024 8:52 AM   used: Lynn

## 2024-10-11 NOTE — PLAN OF CARE
Goal Outcome Evaluation:         Pt has been stable status. She has been breastfeeding well. Her pain has been controlled with tylenol and ibuprofen. She will be discharged home with baby.

## 2024-10-11 NOTE — DISCHARGE INSTRUCTIONS
*You have a Home Care nurse visit planned for EDINSON 10/13. The nurse will contact you after discharge to confirm the appointment time. If you do not receive a call by EDINSON morning, please call Home Care at [phone].   (Please do not schedule a clinic appointment on the same day as home nurse visit.)      Warning Signs after Having a Baby    Keep this paper on your fridge or somewhere else where you can see it.    Call your provider if you have any of these symptoms up to 12 weeks after having your baby.    Thoughts of hurting yourself or your baby  Pain in your chest or trouble breathing  Severe headache not helped by pain medicine  Eyesight concerns (blurry vision, seeing spots or flashes of light, other changes to eyesight)  Fainting, shaking or other signs of a seizure    Call 9-1-1 if you feel that it is an emergency.     The symptoms below can happen to anyone after giving birth. They can be very serious. Call your provider if you have any of these warning signs.    My provider s phone number: _______________________    Losing too much blood (hemorrhage)    Call your provider if you soak through a pad in less than an hour or pass blood clots bigger than a golf ball. These may be signs that you are bleeding too much.    Blood clots in the legs or lungs    After you give birth, your body naturally clots its blood to help prevent blood loss. Sometimes this increased clotting can happen in other areas of the body, like the legs or lungs. This can block your blood flow and be very dangerous.     Call your provider if you:  Have a red, swollen spot on the back of your leg that is warm or painful when you touch it.   Are coughing up blood.     Infection    Call your provider if you have any of these symptoms:  Fever of 100.4 F (38 C) or higher.  Pain or redness around your stitches if you had an incision.   Any yellow, white, or green fluid coming from places where you had stitches or surgery.    Mood Problems  (postpartum depression)    Many people feel sad or have mood changes after having a baby. But for some people, these mood swings are worse.     Call your provider right away if you feel so anxious or nervous that you can't care for yourself or your baby.    Preeclampsia (high blood pressure)    Even if you didn't have high blood pressure when you were pregnant, you are at risk for the high blood pressure disease called preeclampsia. This risk can last up to 12 weeks after giving birth.     Call your provider if you have:   Pain on your right side under your rib cage  Sudden swelling in the hands and face    Remember: You know your body. If something doesn't feel right, get medical help.     For informational purposes only. Not to replace the advice of your health care provider. Copyright 2020 [facility]. All rights reserved. Clinically reviewed by Alanis Ramírez, RNC-OB, MSN. hyaqu [id number] - Rev 02/23.    Warning Signs after Having a Baby    Keep this paper on your fridge or somewhere else where you can see it.    Call your provider if you have any of these symptoms up to 12 weeks after having your baby.    Thoughts of hurting yourself or your baby  Pain in your chest or trouble breathing  Severe headache not helped by pain medicine  Eyesight concerns (blurry vision, seeing spots or flashes of light, other changes to eyesight)  Fainting, shaking or other signs of a seizure    Call 9-1-1 if you feel that it is an emergency.     The symptoms below can happen to anyone after giving birth. They can be very serious. Call your provider if you have any of these warning signs.    My provider s phone number: _______________________    Losing too much blood (hemorrhage)    Call your provider if you soak through a pad in less than an hour or pass blood clots bigger than a golf ball. These may be signs that you are bleeding too much.    Blood clots in the legs or lungs    After you give birth, your body  naturally clots its blood to help prevent blood loss. Sometimes this increased clotting can happen in other areas of the body, like the legs or lungs. This can block your blood flow and be very dangerous.     Call your provider if you:  Have a red, swollen spot on the back of your leg that is warm or painful when you touch it.   Are coughing up blood.     Infection    Call your provider if you have any of these symptoms:  Fever of 100.4 F (38 C) or higher.  Pain or redness around your stitches if you had an incision.   Any yellow, white, or green fluid coming from places where you had stitches or surgery.    Mood Problems (postpartum depression)    Many people feel sad or have mood changes after having a baby. But for some people, these mood swings are worse.     Call your provider right away if you feel so anxious or nervous that you can't care for yourself or your baby.    Preeclampsia (high blood pressure)    Even if you didn't have high blood pressure when you were pregnant, you are at risk for the high blood pressure disease called preeclampsia. This risk can last up to 12 weeks after giving birth.     Call your provider if you have:   Pain on your right side under your rib cage  Sudden swelling in the hands and face    Remember: You know your body. If something doesn't feel right, get medical help.     For informational purposes only. Not to replace the advice of your health care provider. Copyright 2020 [employer]. All rights reserved. Clinically reviewed by Alanis Ramírez, RNC-OB, MSN. [employer] [id number] - Rev 02/23.

## 2024-10-11 NOTE — PROGRESS NOTES
[facility] RN Care Coordinator Note    Yesenia Pickett  [id number]  1995    Chart reviewed, discharge plan discussed with care team, needs assessed. Patient requests home care visit, nurse visit planned EDINSON 10/13. [facility] Intake contacted, updated by this writer; patient added to [facility] schedule. Brigitte  needed. Follow-up post-delivery appointment planned in 2 & 6 weeks in clinic with Dr. Zhang.    RN Care Coordinator will continue to follow and assist as needed with discharge plan. Celina Conway RN on 10/11/2024 at 1:03 PM

## 2024-10-11 NOTE — PLAN OF CARE
Goal Outcome Evaluation:      Plan of Care Reviewed With: patient, spouse    Overall Patient Progress: improvingOverall Patient Progress: improving    Outcome Evaluation: VSS. Postpartum assessment within normal limits    Postpartum assessment is within normal limits. Pain managed with Ibuprofen and Tylenol. Alpha  depression scale and birth certificate discussed and in room. Parents still need to watch shaken baby video.  Attentive to infant and independent with cares.     Lesli Conley RN on 10/11/2024 at 6:23 AM

## 2024-10-11 NOTE — PLAN OF CARE
"Goal Outcome Evaluation: Progressing      Plan of Care Reviewed With: patient    Overall Patient Progress: improving    Patient's VSS. Fundus is firm at umbilicus with scant lochia. Patient is up ad [place] and performing self-cares independently. Reporting uterine cramping pain being treated with acetaminophen, ibuprofen, and heating packs. Patient's  is in the room with her and infant, attentive/supportive and participating in cares.    Patient aware to fill out birth certificate and PPMA; has not yet completed them, states she reads/writes english and she will have her  assist in filling them out.    /62 (BP Location: Left arm, Patient Position: Semi-Leyva's, Cuff Size: Adult Regular)   Pulse 105   Temp 98.2  F (36.8  C) (Oral)   Resp 18   Ht 1.6 m (5' 3\")   Wt 79.8 kg (176 lb)   LMP 01/25/2024 (Approximate)   SpO2 97%   Breastfeeding Unknown   BMI 31.18 kg/m      FARHAT CLARK RN on 10/10/2024 at 10:19 PM      Problem: Adult Inpatient Plan of Care  Goal: Optimal Comfort and Wellbeing  Outcome: Progressing     Problem: Breastfeeding  Goal: Effective Breastfeeding  Outcome: Progressing           "

## 2024-10-11 NOTE — LACTATION NOTE
This note was copied from a baby's chart.  Lactation Visit (declines ):      Hours since Delivery: 1 day 6 hours old.    Gestational Age at Delivery: 37.5 weeks.    Maternal Risk Factors to consider: IVF, anemia, and infant is LPI, SGA or Early term.    Visit: Mother is a multip who states she breast fed her previous children for 24+ months, and typical formula and breastfeeds and this is her goal for this infant as well. In the last 24, infant has been to breast 6 times, has received 18-20mL of formula via bottle per feeding, has voided x5, stooled x3, and had a weight loss of 6.1% (since delivery). Mother states she has no questions regarding breastfeeding or pumping, and she just needs a pump prior to discharge. Provided pumping log, safe milk storage guidelines, as well as supplementation handout. Encouraged family to continue tracking feedings and diaper output; parents verbalized understanding. Encouraged mother to let primary RN know if she would like lactation to return for feeding assistance or if questions arise. Mother aware of lactation resources available to her after discharging from hospital.     Has Breast Pump for Home: Yes, dispensed Spectra pump today.    Education given: Importance of tracking all feedings and diaper output, Pumping for missed feedings at breast, Supplementation volumes during first few days of life, and Breast pump use for home.

## 2024-10-13 ENCOUNTER — MEDICAL CORRESPONDENCE (OUTPATIENT)
Dept: HEALTH INFORMATION MANAGEMENT | Facility: CLINIC | Age: 29
End: 2024-10-13
Payer: COMMERCIAL

## 2024-10-15 ENCOUNTER — MEDICAL CORRESPONDENCE (OUTPATIENT)
Dept: HEALTH INFORMATION MANAGEMENT | Facility: CLINIC | Age: 29
End: 2024-10-15

## 2024-11-27 ENCOUNTER — VIRTUAL VISIT (OUTPATIENT)
Dept: INTERPRETER SERVICES | Facility: CLINIC | Age: 29
End: 2024-11-27

## 2024-11-27 PROBLEM — Z3A.01 LESS THAN 8 WEEKS GESTATION OF PREGNANCY: Status: RESOLVED | Noted: 2024-03-21 | Resolved: 2024-11-27

## 2024-11-27 PROBLEM — D64.9 ANEMIA, UNSPECIFIED TYPE: Status: RESOLVED | Noted: 2021-08-31 | Resolved: 2024-11-27

## 2024-12-24 ENCOUNTER — MEDICAL CORRESPONDENCE (OUTPATIENT)
Dept: HEALTH INFORMATION MANAGEMENT | Facility: CLINIC | Age: 29
End: 2024-12-24
Payer: COMMERCIAL

## 2025-03-08 ENCOUNTER — HEALTH MAINTENANCE LETTER (OUTPATIENT)
Age: 30
End: 2025-03-08

## 2025-04-10 ENCOUNTER — MEDICAL CORRESPONDENCE (OUTPATIENT)
Dept: HEALTH INFORMATION MANAGEMENT | Facility: CLINIC | Age: 30
End: 2025-04-10
Payer: COMMERCIAL

## 2025-07-30 ENCOUNTER — OFFICE VISIT (OUTPATIENT)
Dept: FAMILY MEDICINE | Facility: CLINIC | Age: 30
End: 2025-07-30
Payer: COMMERCIAL

## 2025-07-30 VITALS
SYSTOLIC BLOOD PRESSURE: 113 MMHG | BODY MASS INDEX: 21.51 KG/M2 | TEMPERATURE: 98 F | OXYGEN SATURATION: 100 % | HEART RATE: 84 BPM | WEIGHT: 126 LBS | HEIGHT: 64 IN | DIASTOLIC BLOOD PRESSURE: 79 MMHG | RESPIRATION RATE: 16 BRPM

## 2025-07-30 DIAGNOSIS — R23.3 ABNORMAL BRUISING: ICD-10-CM

## 2025-07-30 DIAGNOSIS — N89.8 VAGINAL DISCHARGE: ICD-10-CM

## 2025-07-30 DIAGNOSIS — D69.6 THROMBOCYTOPENIA: Primary | ICD-10-CM

## 2025-07-30 LAB
BASOPHILS # BLD AUTO: 0 10E3/UL (ref 0–0.2)
BASOPHILS NFR BLD AUTO: 0 %
CLUE CELLS: ABNORMAL
EOSINOPHIL # BLD AUTO: 0 10E3/UL (ref 0–0.7)
EOSINOPHIL NFR BLD AUTO: 1 %
ERYTHROCYTE [DISTWIDTH] IN BLOOD BY AUTOMATED COUNT: 13 % (ref 10–15)
HCT VFR BLD AUTO: 36.4 % (ref 35–47)
HGB BLD-MCNC: 11.3 G/DL (ref 11.7–15.7)
IMM GRANULOCYTES # BLD: 0 10E3/UL
IMM GRANULOCYTES NFR BLD: 0 %
LYMPHOCYTES # BLD AUTO: 1.3 10E3/UL (ref 0.8–5.3)
LYMPHOCYTES NFR BLD AUTO: 33 %
MCH RBC QN AUTO: 25.5 PG (ref 26.5–33)
MCHC RBC AUTO-ENTMCNC: 31 G/DL (ref 31.5–36.5)
MCV RBC AUTO: 82 FL (ref 78–100)
MONOCYTES # BLD AUTO: 0.3 10E3/UL (ref 0–1.3)
MONOCYTES NFR BLD AUTO: 9 %
NEUTROPHILS # BLD AUTO: 2.2 10E3/UL (ref 1.6–8.3)
NEUTROPHILS NFR BLD AUTO: 57 %
PLATELET # BLD AUTO: 126 10E3/UL (ref 150–450)
RBC # BLD AUTO: 4.43 10E6/UL (ref 3.8–5.2)
TRICHOMONAS, WET PREP: ABNORMAL
WBC # BLD AUTO: 3.8 10E3/UL (ref 4–11)
WBC'S/HIGH POWER FIELD, WET PREP: ABNORMAL
YEAST, WET PREP: ABNORMAL

## 2025-07-30 PROCEDURE — 3074F SYST BP LT 130 MM HG: CPT | Performed by: PHYSICIAN ASSISTANT

## 2025-07-30 PROCEDURE — 87491 CHLMYD TRACH DNA AMP PROBE: CPT | Performed by: PHYSICIAN ASSISTANT

## 2025-07-30 PROCEDURE — 87591 N.GONORRHOEAE DNA AMP PROB: CPT | Performed by: PHYSICIAN ASSISTANT

## 2025-07-30 PROCEDURE — 87210 SMEAR WET MOUNT SALINE/INK: CPT | Performed by: PHYSICIAN ASSISTANT

## 2025-07-30 PROCEDURE — 3078F DIAST BP <80 MM HG: CPT | Performed by: PHYSICIAN ASSISTANT

## 2025-07-30 PROCEDURE — 36415 COLL VENOUS BLD VENIPUNCTURE: CPT | Performed by: PHYSICIAN ASSISTANT

## 2025-07-30 PROCEDURE — 99214 OFFICE O/P EST MOD 30 MIN: CPT | Performed by: PHYSICIAN ASSISTANT

## 2025-07-30 PROCEDURE — 85025 COMPLETE CBC W/AUTO DIFF WBC: CPT | Performed by: PHYSICIAN ASSISTANT

## 2025-07-30 NOTE — PROGRESS NOTES
"Subjective:      Yesenia Pickett is a 30 year old female with chief complaint of 2 concerns:    1.  Vaginal discharge  Has sometimes had small amounts of discharge in the past.  Right now she has had increased white discharge for 1 week.  No itching.  She notes that she has had treatment for something like this in the past.  When I review her past wet preps, all of them have been normal.    2.  Easy bruising  History of mild thrombocytopenia in the past.  She did see hematology in 2022 and they advised monitoring.  I reviewed their consult note from 6/15/2022.  Currently has noticed more frequent bruises on her lower extremities.  Primarily knees and shins.  She has had a few bruises on her arms or other locations.  She notes that the bruises show up, go away like you would expect, but the new bruises show up elsewhere.  No known triggers.  No changes in activity.  No current medications.  No other signs of easy bleeding, no nosebleeds or bleeding when she brushes her teeth.      Vaginal Problem (A lot of white discharge, x1 week ) and Derm Problem (Bruising on skin, noticed bruise in one spot then it will heal and will find another bruise in another spot, feels a bit hard, hard spot is tender when touched )    Patient Active Problem List   Diagnosis    Family history of congenital heart disease in sister    Seasonal allergies    Thrombocytopenia    Anemia due to blood loss, acute       Current Outpatient Medications:     Condoms MISC, 1 Device as needed (sexual activity)., Disp: 12 each, Rfl: 3     Objective:     Allergies:  Patient has no known allergies.    Vitals:  /79 (BP Location: Right arm, Patient Position: Sitting, Cuff Size: Adult Regular)   Pulse 84   Temp 98  F (36.7  C) (Temporal)   Resp 16   Ht 1.632 m (5' 4.25\")   Wt 57.2 kg (126 lb)   LMP 06/28/2025 (Approximate)   SpO2 100%   Breastfeeding No   BMI 21.46 kg/m    Body mass index is 21.46 kg/m .    Vital signs reviewed.  General: Patient is " alert and oriented x 3, in no apparent distress  Skin: Few small resolving bruises on her lower extremities today.  She does show me some pictures on her phone of slightly larger bruises that have since resolved    Today's labs pending      Assessment and Plan:   1. Thrombocytopenia (Primary)  2. Abnormal bruising  Chronic with intermittent flareups.  History of mild thrombocytopenia.  Patient has reported increased bruising on the skin.  Not extensive.  No other signs of easy bleeding.  No current medications.  She did see hematology in 2022 and they recommended monitoring.  Will recheck blood cell counts today.  If platelets are mildly low, could continue to monitor.  Otherwise consider referral to hematology for follow-up.  - CBC with Platelets & Differential    3. Vaginal discharge  New concern.  Tests ordered today as below and I will follow-up with results.  - Wet preparation  - Chlamydia trachomatis/Neisseria gonorrhoeae by PCR      This dictation uses voice recognition software, which may contain typographical errors.

## 2025-07-30 NOTE — PROGRESS NOTES
Prior to immunization administration, verified patients identity using patient s name and date of birth. Please see Immunization Activity for additional information.     Screening Questionnaire for Adult Immunization    Are you sick today?   No   Do you have allergies to medications, food, a vaccine component or latex?   No   Have you ever had a serious reaction after receiving a vaccination?   No   Do you have a long-term health problem with heart, lung, kidney, or metabolic disease (e.g., diabetes), asthma, a blood disorder, no spleen, complement component deficiency, a cochlear implant, or a spinal fluid leak?  Are you on long-term aspirin therapy?   No   Do you have cancer, leukemia, HIV/AIDS, or any other immune system problem?   No   Do you have a parent, brother, or sister with an immune system problem?   No   In the past 3 months, have you taken medications that affect  your immune system, such as prednisone, other steroids, or anticancer drugs; drugs for the treatment of rheumatoid arthritis, Crohn s disease, or psoriasis; or have you had radiation treatments?   No   Have you had a seizure, or a brain or other nervous system problem?   No   During the past year, have you received a transfusion of blood or blood    products, or been given immune (gamma) globulin or antiviral drug?   No   For women: Are you pregnant or is there a chance you could become       pregnant during the next month?   No   Have you received any vaccinations in the past 4 weeks?   No     Immunization questionnaire answers were all negative.        Patient instructed to remain in clinic for 15 minutes afterwards, and to report any adverse reactions.     Screening performed by Claudia Fowler CMA on 7/30/2025 at 9:27 AM.        Answers submitted by the patient for this visit:  General Questionnaire (Submitted on 7/30/2025)  Chief Complaint: Chronic problems general questions HPI Form  What is the reason for your visit today? : blue  skin  How many servings of fruits and vegetables do you eat daily?: 2-3  On average, how many sweetened beverages do you drink each day (Examples: soda, juice, sweet tea, etc.  Do NOT count diet or artificially sweetened beverages)?: 2  How many minutes a day do you exercise enough to make your heart beat faster?: 30 to 60  How many days a week do you exercise enough to make your heart beat faster?: 5  How many days per week do you miss taking your medication?: 0  Questionnaire about: Chronic problems general questions HPI Form (Submitted on 7/30/2025)  Chief Complaint: Chronic problems general questions HPI Form

## 2025-07-31 ENCOUNTER — DOCUMENTATION ONLY (OUTPATIENT)
Dept: ONCOLOGY | Facility: HOSPITAL | Age: 30
End: 2025-07-31
Payer: COMMERCIAL

## 2025-07-31 LAB
C TRACH DNA SPEC QL PROBE+SIG AMP: NEGATIVE
N GONORRHOEA DNA SPEC QL NAA+PROBE: NEGATIVE
SPECIMEN TYPE: NORMAL

## 2025-08-10 ENCOUNTER — RESULTS FOLLOW-UP (OUTPATIENT)
Dept: FAMILY MEDICINE | Facility: CLINIC | Age: 30
End: 2025-08-10
Payer: COMMERCIAL

## 2025-08-10 DIAGNOSIS — D69.6 THROMBOCYTOPENIA: Primary | ICD-10-CM
